# Patient Record
Sex: MALE | Race: WHITE
[De-identification: names, ages, dates, MRNs, and addresses within clinical notes are randomized per-mention and may not be internally consistent; named-entity substitution may affect disease eponyms.]

---

## 2019-10-27 NOTE — HP
CIWA Score


Nausea/Vomitin


Muscle Tremors: 2


Anxiety: 3


Agitation: 3


Paroxysmal Sweats: No Perspiration


Orientation: 0-Oriented


Tacttile Disturbances: 1-Very Mild Itch/Numbness


Auditory Disturbances: 0-None


Visual Disturbances: 0-None


Headache: 2-Mild


CIWA-Ar Total Score: 13





- Admission Criteria


[facility] Guidelines: Admission for Medically Managed Detox: 


Requires at least one of the followin. CIWA greater than 12


2. Seizures within the past 24 hours


3. Delirium tremens within the past 24 hours


4. Hallucinations within the past 24 hours


5. Acute intervention needed for co  occurring medical disorder


6. Acute intervention needed for co  occurring psychiatric disorder


7. Severe withdrawal that cannot be handled at a lower level of care (continued


    vomiting, continued diarrhea, abnormal vital signs) requiring intravenous


    medication and/or fluids


8. Pregnancy








Admission ROS S





- HPI


Chief Complaint: 





i need help to stop drinking alcohol and drugs


Allergies/Adverse Reactions: 


 Allergies











Allergy/AdvReac Type Severity Reaction Status Date / Time


 


No Known Allergies Allergy   Verified 10/27/19 09:37











History of Present Illness: 





this 54 years old male with alcohol,cocaine and marijuana,k2,heroin abused,on 

mmtp 100 mgs/day,last medicated methadone on 10/26/19


first time to this facility


never been in detox before


syncope last night


no seizure


seen in [facility] last night ,hepatitis c for 2 years follow up with [facility] 

pending on treatment


bipolar disorder ,non compliance


no sobriety 


plan for outpatient program and mmtp clinic








Exam Limitations: No Limitations





- Ebola screening


Have you traveled outside of the country in the last 21 days: No (N)


Have you had contact with anyone from an Ebola affected area: No


Do you have a fever: No





- Review of Systems


Constitutional: Chills, Malaise, Night Sweats, Changes in sleep, Weakness


EENT: reports: Tearing, Nose Congestion


Respiratory: reports: No Symptoms reported, Other (asthma)


Cardiac: reports: No Symptoms Reported


GI: reports: Nausea, Poor Appetite, Vomiting, Abdominal cramping


: reports: No Symptoms Reported


Musculoskeletal: reports: Back Pain, Muscle Pain


Integumentary: reports: Dryness


Neuro: reports: Headache, Tremors


Endocrine: reports: No Symptoms Reported


Hematology: reports: No Symptoms Reported


Psychiatric: reports: No Sypmtoms Reported, Judgement Intact, Mood/Affect 

Appropiate, other (bipolar disorder)


Other Systems: Reviewed and Negative





Patient History





- Patient Medical History


Hx Anemia: No


Hx Asthma: Yes (on albuterol inhaler)


Hx Chronic Obstructive Pulmonary Disease (COPD): No


Hx Cancer: No


Hx Cardiac Disorders: No


Hx Congestive Heart Failure: No


Hx Hypertension: No


Hx Hypercholesterolemia: No


Hx Pacemaker: No


HX Cerebrovascular Accident: No


Hx Seizures: No


Hx Dementia: No


Hx Diabetes: No


Hx Gastrointestinal Disorders: No


Hx Liver Disease: Yes (hapatitis c no treatment follow up with pmd at [facility])


Hx Genitourinary Disorders: No


Hx Sexually Transmitted Disorders: No


Hx Renal Disease (ESRD): No


Hx Thyroid Disease: No


Hx Human Immunodeficiency Virus (HIV): No ( negative)


Hx Hepatitis C: Yes (no treatment)


Hx Depression: No


Hx Suicide Attempt: No


Hx Bipolar Disorder: Yes (non compliance with medication)


Hx Schizophrenia: Yes (non compliance with medication)


Other Medical History: no sicidal,no homicidal





- Patient Surgical History


Past Surgical History: No





- PPD History


Previous Implant?: Yes


Documented Results: Negative w/o proof


Implanted On Prior [facility] Admission?: No


PPD to be Administered?: Yes





- Smoking Cessation


Smoking history: Current every day smoker


Have you smoked in the past 12 months: Yes


Aproximately how many cigarettes per day: 20


Cigars Per Day: 0


Hx Chewing Tobacco Use: No


Initiated information on smoking cessation: Yes


'Breaking Loose' booklet given: 10/27/19





- Substance & Tx. History


Hx Alcohol Use: Yes


Hx Substance Use: Yes


Substance Use Type: Alcohol, Cocaine, Heroin, Marijuana


Hx Substance Use Treatment: No





- Substances abused


  ** Alcohol


Substance route: Oral


Frequency: Daily


Amount used: 7 PINTS  OF VODKA


Age of first use: 25


Date of last use: 10/26/19





  ** Heroin


Substance route: Injection


Frequency: Daily


Amount used: 13 BAGS


Age of first use: 13


Date of last use: 10/26/19





  ** Cocaine


Substance route: Injection


Frequency: Daily


Amount used: 2 grams


Age of first use: 25


Date of last use: 10/26/19





  ** Marijuana/Hashish


Substance route: Smoking


Frequency: Daily


Amount used: 10$


Age of first use: 19


Date of last use: 10/26/19





  ** K2/Spice


Substance route: Smoking


Frequency: Daily


Amount used: 5$


Age of first use: 54


Date of last use: 10/26/19





Admission Physical Exam [facility]





- Vital Signs


Vital Signs: 


 Vital Signs - 24 hr











  10/27/19





  09:33


 


Temperature 97.7 F


 


Pulse Rate 71


 


Respiratory 20





Rate 


 


Blood Pressure 100/64














- Physical


General Appearance: Yes: Moderate Distress, Tremorous, Irritable, Anxious


HEENTM: Yes: Normal ENT Inspection, ABELINO, Pharynx Normal


Respiratory: Yes: Lungs Clear, Normal Breath Sounds, No Respiratory Distress


Neck: Yes: Within Normal Limits, Supple, Trachea in good position


Breast: Yes: Within Normal Limits


Cardiology: Yes: Within Normal Limits, Regular Rhythm, Regular Rate, S1, S2


Abdominal: Yes: Within Normal Limits, Normal Bowel Sounds, Non Tender, Soft


Genitourinary: Yes: Within Normal Limits


Musculoskeletal: Yes: Back pain, Muscle Pain


Extremities: Yes: Tremors


Neurological: Yes: Within Normal Limits, CNs II-XII NML intact, Fully Oriented, 

Alert


Integumentary: Yes: Dry, Track Marks


Lymphatic: Yes: Within Normal Limits





- Diagnostic


(1) Alcohol dependence with uncomplicated withdrawal


Current Visit: Yes   Status: Acute   





(2) History of opioid abuse


Current Visit: Yes   Status: Acute   





(3) Methadone maintenance therapy patient


Current Visit: Yes   Status: Acute   





(4) IVDU (intravenous drug user)


Current Visit: Yes   Status: Acute   





(5) Bipolar disorder


Current Visit: Yes   Status: Deleted   





(6) Schizophrenia


Current Visit: Yes   Status: Acute   





(7) Dehydration


Current Visit: Yes   Status: Acute   





(8) Asthma


Current Visit: Yes   Status: Chronic   





(9) Use of cane as ambulatory aid


Current Visit: Yes   Status: Acute   





Cleared for Admission [facility]





- Detox or Rehab


[facility] Level of Care: Medically Managed


Detox Regimen/Protocol: Librium





Inpatient Rehab Admission





- Rehab Decision to Admit


Inpatient rehab admission?: No

## 2019-10-27 NOTE — PDOC
History of Present Illness





- General


Stated Complaint: FALL


Time Seen by Provider: 10/27/19 21:36


History Source: Patient, EMS


Exam Limitations: No Limitations





- History of Present Illness


Initial Comments: 





10/27/19 22:05


Tara Graf is a 54M history of polysubstance abuse and psychiatric 

disorders sent from [facility] for a fall.





Patient says he got dizzy and fell forward onto his hands, hit the back of the 

right side of his head and says he has a headache and a bump there. Has pain in 

his right hand, and the middle finger of his left hand. Known history of L knee 

pain.





Admitted to [facility] for detox, requesting 100mg methadone but was only given 

30mg with Librium.





Past History





- Past Medical History


Allergies/Adverse Reactions: 


 Allergies











Allergy/AdvReac Type Severity Reaction Status Date / Time


 


No Known Allergies Allergy   Verified 10/27/19 09:37











Home Medications: 


Ambulatory Orders





Albuterol Sulfate Inhaler - [Ventolin Hfa Inhaler -] 2 inh PO PRN PRN 10/27/19 


Methadone [Dolophine -] 100 mg PO DAILY 10/27/19 








Anemia: No


Asthma: Yes


Cancer: No


Cardiac Disorders: No


CVA: No


COPD: No


CHF: No


Dementia: No


Diabetes: No


GI Disorders: No


 Disorders: No


HTN: No


Hypercholesterolemia: No


Kidney Stones: No


Liver Disease: Yes (hapatitis c no treatment follow up with pmd at [place])


Seizures: No


Thyroid Disease: No





- Surgical History


Abdominal Surgery: No


Appendectomy: No


Cardiac Surgery: No


Cholecystectomy: No


Lung Surgery: No


Neurologic Surgery: No


Orthopedic Surgery: No





- Reproductive History


Testicular Surgery: No





- Psycho Social/Smoking Cessation Hx


Smoking History: Current every day smoker


Have you smoked in the past 12 months: Yes


Number of Cigarettes Smoked Daily: 20


Cigars Per Day: 0


'Breaking Loose' booklet given: 10/27/19


Hx Alcohol Use: Yes


Drug/Substance Use Hx: Yes


Substance Use Type: Alcohol, Cocaine, Heroin, Marijuana


Hx Substance Use Treatment: Yes





**Review of Systems





- Review of Systems


Constitutional: Yes: Weakness.  No: Chills, Fever


HEENTM: No: Symptoms Reported


Respiratory: No: Symptoms reported


Cardiac (ROS): Yes: Lightheadedness.  No: Chest Pain, Syncope


ABD/GI: No: Constipated, Diarrhea, Nausea, Vomiting


: No: Symptoms Reported


Musculoskeletal: Yes: Muscle Weakness


Integumentary: No: Symptoms Reported


Neurological: Yes: Headache, Unsteady Gait (known L knee pain).  No: Numbness, 

Paresthesia


Hematologic/Lymphatic: No: Symptoms Reported


All Other Systems: Reviewed and Negative





*Physical Exam





- Physical Exam


General Appearance: Yes: Nourished, Appropriately Dressed, Moderate Distress (

agitated, says he feels sick, wants to walk home)


HEENT: positive: EOMI, Normal Voice, Symmetrical, Hearing Grossly Normal, Other 

(raised bump to the posterior R head).  negative: Scleral Icterus (R), Scleral 

Icterus (L)


Neck: positive: Supple.  negative: Tender, Rigid, Lymphadenopathy (R), 

Lymphadenopathy (L)


Respiratory/Chest: positive: Lungs Clear, Normal Breath Sounds.  negative: 

Chest Tender, Respiratory Distress, Accessory Muscle Use, Crackles, Rales, 

Rhonchi, Stridor, Wheezing


Cardiovascular: positive: Regular Rhythm, Regular Rate.  negative: Murmur


Gastrointestinal/Abdominal: positive: Normal Bowel Sounds, Flat, Soft.  negative

: Tender, Hernia


Musculoskeletal: positive: Normal Inspection.  negative: CVA Tenderness


Extremity: positive: Normal Inspection, Normal Range of Motion, Tender (L knee, 

middle finger L hand. Palpation no evidence of dislocation or fracture, but 

tender to palpation.), Other (L knee tender to palpation, ROM limited 2/2 pain. 

BLE sensation intact to LT, DP pulses present, skin warm. R hand full ROM, no 

evidence of fracture or dislocation, no erythema. L hand full ROM, swelling and 

erythema noted to L middle finger.)


Integumentary: positive: Normal Color, Dry, Warm


Neurologic: positive: Alert, Normal Mood/Affect, Normal Response, Other (walks 

with LLE limp, no unsteadiness)





ED Treatment Course





- RADIOLOGY


Radiology Studies Ordered: 














 Category Date Time Status


 


 CERVICAL SPINE CT W/O CONTR [CT] Stat CT Scan  10/27/19 21:38 Ordered


 


 HEAD CT WITHOUT CONTRAST [CT] Stat CT Scan  10/27/19 21:38 Ordered


 


 HAND- LEFT [RAD] Stat Radiology  10/27/19 22:04 Ordered


 


 HAND- RIGHT [RAD] Stat Radiology  10/27/19 21:38 Ordered














Medical Decision Making





- Medical Decision Making





10/27/19 22:05


Tara Graf is a 54M history of polysubstance abuse and psychiatric 

disorders sent from [facility] for a fall.





Has obvious bump to back of head, complains of pain in his hands and L knee. 





CT head/c-cpine


XR hands and L knee





10/27/19 23:57


Imaging done, no evidence of fracture on hands or knee.


Knee pain likely 2/2 muscular etiology vs. tendon injury, no arthritic changes 

or fracture notable.


Pending CT head and neck read.





10/28/19 01:02


No ICH on CT head, no fracture on C-Spine.





Stable to be discharged back to [facility].





Discharge





- Discharge Information


Problems reviewed: Yes


Clinical Impression/Diagnosis: 


Fall


Qualifiers:


 Encounter type: initial encounter Qualified Code(s): W19.XXXA - Unspecified 

fall, initial encounter





Condition: Stable


Disposition: TRANSFER ACUTE CARE/OTHER HOSP





- Admission


No





- Follow up/Referral





- Patient Discharge Instructions


Patient Printed Discharge Instructions:  How to Prevent Falls


Additional Instructions: 


Today you were evaluated for a fall. The CT scans of your head and neck show no 

broken bones or bleeding in your brain, X-rays of your hands and knee do not 

show any broken bones. You have no severe injuries from your fall requiring 

treatment at this time.





Your CT scan shows some atelectatic changes that are not immediately concerning

, but would need a further CT scan of your chest to further examine. If you 

need images from your visit, please call our radiology department.





Please follow-up with your primary doctor in the next 3 days for further care. 

At [facility], please continue your detox as scheduled. If you experience more 

falls, headache, changes to vision, neck pain, nausea, vomiting, or any other 

new or concerning symptoms, please return to the emergency room.





- Post Discharge Activity

## 2019-10-27 NOTE — PN
[facility] Progress Note


Note: 





MD'S NOTE:





INFORMED AT ABOUT 8:10PM THAT THE PT. FELL ON THE FLOOR 





SUB: FEELS WEAK AND DIZZY AND FELL


       HE CLAIMS THAT HE HIT THE RT. SIDE OF THE HEAD


       PAIN IN RT. WRIST+


OBJ: THE PT. IS  X 3, NOT DYSPNEIC AND NOT IN ACUTE DISTRESS





V/S: 98.2-16-/62





L/E: RT. SIDE OF THE HEAD: TENDERNESS++.


                                       NO SWELLING


      RT. WRIST REGION: MILD SWELLING AND TENDERNESS+++


                                 MOVEMENTS ARE LIMITED





S/E: CNS: PUPILS: SABIHA


              NECK: NO RIGIDITY NOTED


              NO FOCAL RIGIDITY NOTED AT THE TIME OF THIS EXAMINATION





       CVS: -JVD, NL HEART SOUNDS, NO MURMURS


    LUNGS: VESICULAR BREATH SOUNDS, NO RALES, NO RHONCHI


       ABD: SOFT, NT, B.S.+





IMPRESSION: FALL WITH SOFT TISSUE INJURY - RT. SIDE OF THE HEAD


                  SPRAIN - RT. WRIST REGION - R/O: FRACTURE





PLANS:        -THE PT. WAS SENT TO THE ER FOR EVALUATION AS PER THE 


                   FALL PROTOCOL #1


                  -DR. SAMUELS, THE ER-MD WAS MADE AWARE OF THE EVENTS


                  -WILL F/U AS NEEDED





PROVIDER: BLAYNE GOODMAN MD

## 2019-10-28 NOTE — PDOC
Documentation entered by Chelsea Woodard SCRIBE, acting as scribe for Brittny Mayes MD.








Brittny Mayes MD:  This documentation has been prepared by the tishaibe, 

Chelsea Woodard SCRIBE, under my direction and personally reviewed by me in its 

entirety.  I confirm that the documentation accurately reflects all work, 

treatment, procedures, and medical decision making performed by me.  





Attending Attestation





- Resident


Resident Name: Dimas Johnson





- ED Attending Attestation


I have performed the following: I have examined & evaluated the patient, The 

case was reviewed & discussed with the resident, I agree w/resident's findings 

& plan, Exceptions are as noted





- HPI


HPI: 


10/27/19 23:05


The patient is a 54 year old male with a past medical history significant for 

polysubstance abuse who presents to the emergency department from [place] via EMS s

/p a fall. The patient presents s/p a fall to his right side landing on his 

right hand. The patient reports he felt dizzy prior to the fall. Denies fever 

or chills. 


Allergies: NKDA





- Physicial Exam


PE: 





10/27/19 23:50


I agree with Dr Dimas Johnson 's physical exam.





- Medical Decision Making





10/28/19 01:00


53 yo male who is admitted to [facility] and fell and hit his head and he 

was transported here for imaging





ct scan head No evidence of acute intracranial hemorrhage, right maxillary 

sinus disease, nasal septal deviation


CAT scan of the cervical spine impression


1 no evidence of acute fractures


C2-C3 anterior listhesis


3 mild degenerative changes


Emphysematous, atelectatic and/or fibrotic changes noted a dedicated chest CT 

is recommended for complete evaluation of the lungs


Right maxillary sinus disease


10/28/19 01:08


hand radiographs  negative for acute fractures 


pt discharged back to [facility]

## 2019-10-28 NOTE — PN
[facility] CIWA





- CIWA Score


Nausea/Vomitin-Mild Nausea/No Vomiting


Muscle Tremors: 2


Anxiety: 2


Agitation: 2


Paroxysmal Sweats: No Perspiration


Orientation: 0-Oriented


Tacttile Disturbances: 1-Very Mild Itch/Numbness


Auditory Disturbances: 0-None


Visual Disturbances: 0-None


Headache: 2-Mild


CIWA-Ar Total Score: 10





[facility] Progress Note (SOAP)


Subjective: 





alert,irritable,anxious,interrupted sleep,pain in the body ,back,tremor


Objective: 





10/28/19 14:20


 Vital Signs











Temperature  97.7 F   10/28/19 13:12


 


Pulse Rate  71   10/28/19 13:12


 


Respiratory Rate  16   10/28/19 13:12


 


Blood Pressure  124/58 L  10/28/19 13:12


 


O2 Sat by Pulse Oximetry (%)      








 Laboratory Last Values











WBC  5.9 K/mm3 (4.0-10.0)   10/28/19  08:20    


 


RBC  4.53 M/mm3 (4.00-5.60)   10/28/19  08:20    


 


Hgb  12.0 GM/dL (11.7-16.9)   10/28/19  08:20    


 


Hct  37.8 % (35.4-49)   10/28/19  08:20    


 


MCV  83.3 fl (80-96)   10/28/19  08:20    


 


MCH  26.5 pg (25.7-33.7)   10/28/19  08:20    


 


MCHC  31.8 g/dl (32.0-35.9)  L  10/28/19  08:20    


 


RDW  14.7 % (11.9-15.9)   10/28/19  08:20    


 


Plt Count  278 K/MM3 (134-434)   10/28/19  08:20    


 


MPV  8.0 fl (7.5-11.1)   10/28/19  08:20    


 


Sodium  139 mmol/L (136-145)   10/28/19  08:20    


 


Potassium  4.3 mmol/L (3.5-5.1)   10/28/19  08:20    


 


Chloride  104 mmol/L ()   10/28/19  08:20    


 


Carbon Dioxide  29 mmol/L (21-32)   10/28/19  08:20    


 


Anion Gap  6 MMOL/L (8-16)  L  10/28/19  08:20    


 


BUN  18.4 mg/dL (7-18)  H  10/28/19  08:20    


 


Creatinine  0.8 mg/dL (0.55-1.3)   10/28/19  08:20    


 


Est GFR (CKD-EPI)AfAm  116.56   10/28/19  08:20    


 


Est GFR (CKD-EPI)NonAf  100.57   10/28/19  08:20    


 


Random Glucose  92 mg/dL ()   10/28/19  08:20    


 


Calcium  8.9 mg/dL (8.5-10.1)   10/28/19  08:20    


 


Total Bilirubin  0.2 mg/dL (0.2-1)   10/28/19  08:20    


 


AST  27 U/L (15-37)   10/28/19  08:20    


 


ALT  24 U/L (13-61)   10/28/19  08:20    


 


Alkaline Phosphatase  117 U/L ()   10/28/19  08:20    


 


Total Protein  6.8 g/dl (6.4-8.2)   10/28/19  08:20    


 


Albumin  3.0 g/dl (3.4-5.0)  L  10/28/19  08:20    


 


RPR Titer  Nonreactive  (NONREACTIVE)   10/28/19  08:20    


 


HIV 1&2 Antibody Screen  Negative   10/28/19  08:20    


 


HIV P24 Antigen  Negative   10/28/19  08:20    











Assessment: 





10/28/19 14:21


withdrawal symptom


Plan: 





continue detox,bun 18.4,probably due to dehydration,encourage oral fluid,obtain 

cane for ambulatory aid
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
## 2019-10-29 NOTE — PN
[facility] CIWA





- CIWA Score


Nausea/Vomitin-Mild Nausea/No Vomiting


Muscle Tremors: 2


Anxiety: 2


Agitation: 2


Paroxysmal Sweats: No Perspiration


Orientation: 0-Oriented


Tacttile Disturbances: 1-Very Mild Itch/Numbness


Auditory Disturbances: 0-None


Visual Disturbances: 0-None


Headache: 1-Very Mild


CIWA-Ar Total Score: 9





[facility] COWS





- Scale


Resting Pulse: 0= FL 80 or Below


Sweatin= No chills or Flushing


Restless Observation: 1= Difficult to Sit Still


Pupil Size: 1= Pupils >than Normal


Bone or Joint Aches: 1= Mild Discomfort


Runny Nose/ Eye Tearin= Nasal Congestion


GI Upset > 30mins: 2= Nausea/Diarrhea


Tremor Observation of Outstretched Hands: 2= Slight Tremor Visible


Yawning Observation: 1= 1-2x During Session


Anxiety or Irritability: 2=Irritable/Anxious


Goose Flesh Skin: 0=Smooth Skin


COWS Score: 11





[facility] Progress Note (SOAP)


Subjective: 





alert,irritable,anxious,loose bowel movement,pain in the body,back


Objective: 





10/29/19 11:30


 Vital Signs











Temperature  97.9 F   10/29/19 09:37


 


Pulse Rate  84   10/29/19 09:37


 


Respiratory Rate  18   10/29/19 09:37


 


Blood Pressure  119/64   10/29/19 09:37


 


O2 Sat by Pulse Oximetry (%)      











Assessment: 





10/29/19 11:31


withdrawal symptom


Plan: 





continue detox methadone and lirium regimen

## 2019-10-30 NOTE — PN
[facility] Progress Note


Note: 





 Vital Signs











Temperature  98.1 F   10/30/19 17:04


 


Pulse Rate  64   10/30/19 17:04


 


Respiratory Rate  20   10/30/19 17:04


 


Blood Pressure  92/57 L  10/30/19 17:04


 


O2 Sat by Pulse Oximetry (%)      








/80


PATIENT EVALUATED D/T EPISODE  OF DIZZY SPELL AND LOWER SELF TO THE FLOOR 


PATIENT REPORTS HE BEND FORWARD AND FELT DIZZY, DENIES HEADACHE  ANY OTHER 

SYMPTOMS


PATIENT AOX3 NO ACUTE DISTRESS


EENT WNL, CHEILITHIS


FULL ROM NO GAIT DISTURBANCE AMBULATING IN THE UNIT WITH CANE 





VERTIGO 





INCREASE PO FLUIDS 


WHEELCHAIR 


CONTINUE TO MONITOR

## 2019-10-30 NOTE — PN
S CIWA





- CIWA Score


Nausea/Vomitin-Mild Nausea/No Vomiting


Muscle Tremors: 1-None Visible, but Felt


Anxiety: 2


Agitation: 2


Paroxysmal Sweats: No Perspiration


Orientation: 0-Oriented


Tacttile Disturbances: 1-Very Mild Itch/Numbness


Auditory Disturbances: 0-None


Visual Disturbances: 0-None


Headache: 1-Very Mild


CIWA-Ar Total Score: 8





BHS Progress Note (SOAP)


Subjective: 





alert,irritable,anxious,interrupted sleep,pain in the body,knees


Objective: 





10/30/19 09:43


 Vital Signs











Temperature  97.9 F   10/30/19 06:23


 


Pulse Rate  74   10/30/19 06:23


 


Respiratory Rate  18   10/30/19 06:23


 


Blood Pressure  108/63   10/30/19 06:23


 


O2 Sat by Pulse Oximetry (%)      











Assessment: 





10/30/19 09:43


withdrawal symptom


Plan: 





continue detox librium regimen

## 2019-10-31 NOTE — PN
BHS COWS





- Scale


Resting Pulse: 1= MI 


Sweatin= No chills or Flushing


Restless Observation: 1= Difficult to Sit Still


Pupil Size: 1= Pupils >than Normal


Bone or Joint Aches: 1= Mild Discomfort


Runny Nose/ Eye Tearin= Nasal Congestion


GI Upset > 30mins: 1= Stomach Cramp


Tremor Observation of Outstretched Hands: 1= Tremor Felt, Not Seen


Yawning Observation: 1= 1-2x During Session


Anxiety or Irritability: 2=Irritable/Anxious


Goose Flesh Skin: 0=Smooth Skin


COWS Score: 10





BHS Progress Note (SOAP)


Subjective: 





alert,feel weak,diarrhea,pain in the body


Objective: 





10/31/19 09:46


 Vital Signs











Temperature  97.7 F   10/31/19 09:19


 


Pulse Rate  91 H  10/31/19 09:19


 


Respiratory Rate  20   10/31/19 09:19


 


Blood Pressure  103/62   10/31/19 09:19


 


O2 Sat by Pulse Oximetry (%)      











Assessment: 





10/31/19 09:46


withdrawal symptom


Plan: 





continue detox librium regimen,discharge in am

## 2019-11-01 NOTE — DS
BHS Detox Discharge Summary


Admission Date: 


10/27/19





Discharge Date: 11/01/19





- History


Present History: Alcohol Dependence, Cannabis Dependence, Cocaine Dependence, 

MMTP





- Physical Exam Results


Vital Signs: 


 Vital Signs











Temperature  96.6 F L  11/01/19 06:00


 


Pulse Rate  75   11/01/19 06:00


 


Respiratory Rate  18   11/01/19 06:00


 


Blood Pressure  105/76   11/01/19 06:00


 


O2 Sat by Pulse Oximetry (%)      











Pertinent Admission Physical Exam Findings: 





pt arrived in withdrawals


 Vital Signs











Temperature  96.6 F L  11/01/19 06:00


 


Pulse Rate  75   11/01/19 06:00


 


Respiratory Rate  18   11/01/19 06:00


 


Blood Pressure  105/76   11/01/19 06:00


 


O2 Sat by Pulse Oximetry (%)      








 Laboratory Tests











  10/28/19 10/28/19 10/28/19





  08:20 08:20 08:20


 


WBC   5.9 


 


RBC   4.53 


 


Hgb   12.0 


 


Hct   37.8 


 


MCV   83.3 


 


MCH   26.5 


 


MCHC   31.8 L 


 


RDW   14.7 


 


Plt Count   278 


 


MPV   8.0 


 


Sodium    139


 


Potassium    4.3


 


Chloride    104


 


Carbon Dioxide    29


 


Anion Gap    6 L


 


BUN    18.4 H


 


Creatinine    0.8


 


Est GFR (CKD-EPI)AfAm    116.56


 


Est GFR (CKD-EPI)NonAf    100.57


 


Random Glucose    92


 


Calcium    8.9


 


Total Bilirubin    0.2


 


AST    27


 


ALT    24


 


Alkaline Phosphatase    117


 


Total Protein    6.8


 


Albumin    3.0 L


 


RPR Titer   


 


HIV 1&2 Antibody Screen  Negative  


 


HIV P24 Antigen  Negative  














  10/28/19





  08:20


 


WBC 


 


RBC 


 


Hgb 


 


Hct 


 


MCV 


 


MCH 


 


MCHC 


 


RDW 


 


Plt Count 


 


MPV 


 


Sodium 


 


Potassium 


 


Chloride 


 


Carbon Dioxide 


 


Anion Gap 


 


BUN 


 


Creatinine 


 


Est GFR (CKD-EPI)AfAm 


 


Est GFR (CKD-EPI)NonAf 


 


Random Glucose 


 


Calcium 


 


Total Bilirubin 


 


AST 


 


ALT 


 


Alkaline Phosphatase 


 


Total Protein 


 


Albumin 


 


RPR Titer  Nonreactive


 


HIV 1&2 Antibody Screen 


 


HIV P24 Antigen 








today pt is aaox3


ambulating


no acute distress


no s/s of withdrawals








- Treatment


Hospital Course: Detox Protocol Followed, Detoxed Safely, Responded well, 

Discharged Condition Good, Rehab Referral Accepted


Patient has Accepted a Rehab Referral to: pt referred to [facility] inpatient 

rehab





- Medication


Discharge Medications: 


Ambulatory Orders





Albuterol Sulfate Inhaler - [Ventolin Hfa Inhaler -] 2 inh PO PRN PRN 10/27/19 


Methadone [Dolophine -] 100 mg PO DAILY 10/27/19 











- Diagnosis


(1) Alcohol dependence with uncomplicated withdrawal


Current Visit: Yes   Status: Chronic   





(2) Cannabis dependence


Current Visit: Yes   Status: Chronic   





(3) Cocaine dependence


Current Visit: Yes   Status: Chronic   


Qualifiers: 


   Substance use status: uncomplicated   Qualified Code(s): F14.20 - Cocaine 

dependence, uncomplicated   





(4) IVDU (intravenous drug user)


Current Visit: Yes   Status: Chronic   





(5) Methadone maintenance therapy patient


Current Visit: Yes   Status: Chronic   





(6) Schizophrenia


Current Visit: Yes   Status: Acute   





(7) Substance-induced anxiety disorder


Current Visit: Yes   Status: Acute   





(8) Substance-induced sleep disorder


Current Visit: Yes   Status: Acute   





(9) Use of cane as ambulatory aid


Current Visit: Yes   Status: Acute   





(10) Asthma


Current Visit: Yes   Status: Chronic   


Qualifiers: 


   Asthma severity: mild   Asthma persistence: intermittent 





(11) Hepatitis C


Current Visit: Yes   Status: Chronic   





(12) Nicotine dependence


Current Visit: Yes   Status: Chronic   


Qualifiers: 


   Nicotine product type: cigarettes   Substance use status: uncomplicated   

Qualified Code(s): F17.210 - Nicotine dependence, cigarettes, uncomplicated   





(13) PTSD (post-traumatic stress disorder)


Current Visit: Yes   Status: Chronic   





(14) Fall


Current Visit: Yes   Status: Acute   


Qualifiers: 


   Encounter type: initial encounter   Qualified Code(s): W19.XXXA - 

Unspecified fall, initial encounter   





- AMA


Did Patient Leave Against Medical Advice: No

## 2019-11-29 NOTE — HP
CIWA Score


Nausea/Vomitin-No Nausea/No Vomiting


Muscle Tremors: 1-None Visible, but Felt


Anxiety: 1-Mildly Anxious


Agitation: 0-Normal Activity


Paroxysmal Sweats: No Perspiration


Orientation: 1-Uncertain about Date


Tacttile Disturbances: 0-None


Auditory Disturbances: 2-Mild Harshness/Frighten


Visual Disturbances: 2-Mild Sensitivity


Headache: 3-Moderate


CIWA-Ar Total Score: 10





- Admission Criteria


[facility] Guidelines: Admission for Medically Managed Detox: 


Requires at least one of the followin. CIWA greater than 12


2. Seizures within the past 24 hours


3. Delirium tremens within the past 24 hours


4. Hallucinations within the past 24 hours


5. Acute intervention needed for co  occurring medical disorder


6. Acute intervention needed for co  occurring psychiatric disorder


7. Severe withdrawal that cannot be handled at a lower level of care (continued


    vomiting, continued diarrhea, abnormal vital signs) requiring intravenous


    medication and/or fluids


8. Pregnancy








Admitting History and Physical





- Smoking History


Smoking history: Current every day smoker


Have you smoked in the past 12 months: Yes


Aproximately how many cigarettes per day: 20





- Alcohol/Substance Use


Hx Alcohol Use: Yes





Admission ROS [facility]





- [facility]


Allergies/Adverse Reactions: 


 Allergies











Allergy/AdvReac Type Severity Reaction Status Date / Time


 


No Known Allergies Allergy   Verified 19 18:59











History of Present Illness: 








Search Terms: tico moulton, 1967


Search Date: 2019 08:39:38 PM





The Drug Utilization Report below displays all of the controlled substance 

prescriptions, if any, that your patient has filled in the last twelve months. 

The information displayed on this report is compiled from pharmacy submissions 

to the Department, and accurately reflects the information as submitted by the 

pharmacies.





This report was requested by: Gita Lewis | Reference #: [id number]





There are no results for the search terms that you entered.





pt here requesting rehab for cocaine, K2  and cannabis  use  


reports etoh 3  bottles /day  of rum


MMTP x  5 yrs , current daily dose  100 mg  


pt is very poor historian , drowsy  , arousable by verbal stimuli  . 


has  d/c paperwork from [facility]  indicating  ETOH  use  , opioid use  

, cocaine use  


Exam Limitations: Clinical Condition





- Ebola screening


Have you traveled outside of the country in the last 21 days: No (N)


Have you had contact with anyone from an Ebola affected area: No


Do you have a fever: No





- Review of Systems


Constitutional: See HPI


EENT: reports: See HPI


Respiratory: reports: No Symptoms reported


Cardiac: reports: No Symptoms Reported


GI: reports: See HPI


: reports: No Symptoms Reported


Musculoskeletal: reports: No Symptoms Reported


Integumentary: reports: No Symptoms Reported


Neuro: reports: See HPI


Endocrine: reports: No Symptoms Reported


Psychiatric: reports: Disorientated





Patient History





- Patient Medical History


Hx Anemia: No


Hx Asthma: Yes


Hx Chronic Obstructive Pulmonary Disease (COPD): No


Hx Cancer: No


Hx Cardiac Disorders: No


Hx Congestive Heart Failure: No


Hx Hypertension: No


Hx Hypercholesterolemia: No


Hx Pacemaker: No


HX Cerebrovascular Accident: No


Hx Seizures: No


Hx Dementia: No


Hx Diabetes: No


Hx Gastrointestinal Disorders: No


Hx Liver Disease: Yes (hapatitis c no treatment follow up with pmd at [place])


Hx Genitourinary Disorders: No


Hx Sexually Transmitted Disorders: No


Hx Renal Disease (ESRD): No


Hx Thyroid Disease: No


Hx Human Immunodeficiency Virus (HIV): No ( negative)


Hx Hepatitis C: Yes (no treatment)


Hx Depression: Yes


Hx Suicide Attempt: No


Hx Bipolar Disorder: Yes (non compliance with medication)


Hx Schizophrenia: No





- Patient Surgical History


Past Surgical History: No


Hx Neurologic Surgery: No


Hx Cataract Extraction: No


Hx Cardiac Surgery: No


Hx Lung Surgery: No


Hx Breast Surgery: No


Hx Breast Biopsy: No


Hx Abdominal Surgery: No


Hx Appendectomy: No


Hx Cholecystectomy: No


Hx Genitourinary Surgery: No


Hx  Section: No


Hx Orthopedic Surgery: No


Anesthesia Reaction: No





- PPD History


Date: 10/29/19





- Smoking Cessation


Smoking history: Current every day smoker


Have you smoked in the past 12 months: Yes


Aproximately how many cigarettes per day: 20


Cigars Per Day: 0


Hx Chewing Tobacco Use: No


Initiated information on smoking cessation: Yes


'Breaking Loose' booklet given: 19





- Substances abused


  ** Alcohol


Substance route: Oral


Frequency: Daily


Amount used: 3 cups  vodka


Age of first use: 25


Date of last use: 19





  ** Heroin


Substance route: Injection


Frequency: Daily


Amount used: 4 bags


Age of first use: 13


Date of last use: 19





  ** Cocaine


Substance route: Injection


Frequency: Daily


Amount used: 2 grams


Age of first use: 25


Date of last use: 19





  ** Marijuana/Hashish


Substance route: Smoking


Frequency: Daily


Amount used: 10$


Age of first use: 19


Date of last use: 19





  ** K2/Spice


Substance route: Smoking


Frequency: Daily


Amount used: 5$


Age of first use: 54


Date of last use: 19





Admission Physical Exam BHS





- Vital Signs


Vital Signs: 


 Vital Signs - 24 hr











  19





  19:10


 


Temperature 98.5 F


 


Pulse Rate 69


 


Respiratory 16





Rate 


 


Blood Pressure 97/63














- Physical


General Appearance: Yes: Mild Distress, Intoxicated, Other (drowsy)


HEENTM: Yes: EOMI, Hearing grossly Normal, Normocephalic


Respiratory: Yes: Chest Non-Tender, No Respiratory Distress, No Accessory 

Muscle Use, Wheezing (scattered expiratory)


Neck: Yes: No masses,lesions,Nodules, Trachea in good position


Cardiology: Yes: Regular Rhythm, Regular Rate, S1, S2


Abdominal: Yes: Non Tender, Soft


Musculoskeletal: Yes: full range of Motion, Gait Steady


Extremities: Yes: Normal Range of Motion, Pedal Edema


Neurological: Yes: Fully Oriented, Alert, Motor Strength 5/5


Integumentary: Yes: Warm, Track Marks





- Diagnostic


(1) Alcohol dependence with uncomplicated withdrawal


Current Visit: Yes   Status: Chronic   





(2) Cannabis dependence


Current Visit: Yes   Status: Chronic   





(3) Cocaine dependence


Current Visit: Yes   Status: Chronic   


Qualifiers: 


   Substance use status: uncomplicated   Qualified Code(s): F14.20 - Cocaine 

dependence, uncomplicated   





(4) Methadone maintenance therapy patient


Current Visit: Yes   Status: Chronic   





Breathalyzer





- Breathalyzer


Breathalyzer: 0





Urine Drug Screen





- Test Device


Lot number: [id number]


Expiration date: 21





- Control


Is test valid?: Yes





- Results


Drug screen NEGATIVE: No


Urine drug screen results: THC-Marijuana, CARLOS-Cocaine, MOP-Opiates, MTD-

Methadone, BZO-Benzodiazepines





Inpatient Rehab Admission





- Rehab Decision to Admit


Inpatient rehab admission?: No

## 2019-11-30 NOTE — CONSULT
BHS Psychiatric Consult





- Data


Date of interview: 11/30/19


Admission source: [facility]


Identifying data: Readmission to [facility] for this 55 y/o  male self-

referred for detoxification (JULIO issues : heroin, cocaine, cannabis/K2, alcohol

, nicotine). Interviewed at [address] [place] [address]. Patient is single, father of four, homeless

, unemployed and supported on welfare.


Substance Abuse History: Discussed with the patient. Details in current BHS 

report as follows : Smoking history: Current every day smoker.  Have you smoked 

in the past 12 months: Yes.  Aproximately how many cigarettes per day: 20.  

Cigars Per Day: 0.  Hx Chewing Tobacco Use: No.  Initiated information on 

smoking cessation: Yes.  'Breaking Loose' booklet given: 11/29/19.  - 

Substances abused.  ** Alcohol.  Substance route: Oral.  Frequency: Daily.  

Amount used: 3 cups  vodka.  Age of first use: 25.  Date of last use: 11/28/19.

  ** Heroin.  Substance route: Injection.  Frequency: Daily.  Amount used: 4 

bags.  Age of first use: 13.  Date of last use: 11/28/19.  ** Cocaine.  

Substance route: Injection.  Frequency: Daily.  Amount used: 2 grams.  Age of 

first use: 25.  Date of last use: 11/22/19.  ** Marijuana/Hashish.  Substance 

route: Smoking.  Frequency: Daily.  Amount used: 10$.  Age of first use: 19.  

Date of last use: 11/22/19.  ** K2/Spice.  Substance route: Smoking.  Frequency

: Daily.  Amount used: 5$.  Age of first use: 54.  Date of last use: 11/28/19


Medical History: Medical profile is remarkable for hepatitis C, bronchial 

asthma and antecedent of withdrawal-related seizures.


Psychiatric History: Patient endorses a history of " more than 15 " psychiatric 

hospitalizations. First contact with Psychiatry occurred more than 10 years ago 

(diagnosed at the time with post-traumatic stress disorder, bipolar disorder 

versus schizophrenia). Mr Armstrong is currently seeing Dr Hull, 

psychiatrist at [facility] in the [place]. Patient is maintained 

on a regimen of lexapro 20 mg/day + zyprexa 20 mg/hs + klonopin 2 mg/bid + 

ambien 10 mg/hs. He is also on methadone maintenance (100 mg/day) at the [facility] program. Patient reports a history of two serious suicide 

attempts (wrist-cutting in 2004 + hanging in 1989).


Physical/Sexual Abuse/Trauma History: Not discussed. Patient declines.


Additional Comment: Urine drug screen results: THC-Marijuana, CARLOS-Cocaine, MOP-

Opiates, MTD-Methadone, BZO-Benzodiazepines. Noted.





Mental Status Exam





- Mental Status Exam


Alert and Oriented to: Time, Place, Person


Cognitive Function: Good


Patient Appearance: Well Groomed (covered with tattoos : neck, arms and forearms

)


Mood: Nervous, Anxious, Irritable


Affect: Mood Congruent, Constricted


Patient Behavior: Inappropriate (medication-seeking), Cooperative


Speech Pattern: Clear


Voice Loudness: Normal


Thought Process: Goal Oriented


Thought Disorder: Not Present


Hallucinations: Denies


Suicidal Ideation: Denies


Homicidal Ideation: Denies


Insight/Judgement: Poor


Sleep: Poorly, Difficulty falling asleep


Appetite: Good


Gait/Station: Normal (walks independently)





Psychiatric Findings





- Problem List (Axis 1, 2,3)


(1) Alcohol dependence with uncomplicated withdrawal


Current Visit: Yes   Status: Acute   





(2) Opioid dependence on agonist therapy


Current Visit: Yes   Status: Chronic   





(3) Cannabis dependence


Current Visit: Yes   Status: Chronic   





(4) Cocaine dependence


Current Visit: Yes   Status: Chronic   


Qualifiers: 


   Substance use status: uncomplicated   Qualified Code(s): F14.20 - Cocaine 

dependence, uncomplicated   





(5) Nicotine dependence


Current Visit: Yes   Status: Chronic   





(6) Substance induced mood disorder


Current Visit: Yes   Status: Chronic   





(7) History of posttraumatic stress disorder (PTSD)


Current Visit: Yes   Status: Chronic   Comment: Self-report.   





(8) History of bipolar disorder


Current Visit: Yes   Status: Chronic   Comment: Self-report.   





(9) Insomnia


Current Visit: Yes   Status: Acute   





- Initial Treatment Plan


Initial Treatment Plan: Psychoeducation. Records ([facility]) are revisited. 

Detoxification. Medications reconciled : zyprexa 10 mg po hs (start date : 12/1/ 19) + lexapro 20 mg po daily. Insomnia is addressed with melatonin at bedtime. 

Side effects/benefits of these medications are discussed with the patient. Mr Armstrong gave verbal consent to MD. Bernstein.

## 2019-11-30 NOTE — PN
BHS CIWA





- CIWA Score


Nausea/Vomitin-Mild Nausea/No Vomiting


Muscle Tremors: 2


Anxiety: 2


Agitation: 3


Paroxysmal Sweats: 3


Orientation: 0-Oriented


Tacttile Disturbances: 0-None


Auditory Disturbances: 0-None


Visual Disturbances: 0-None


Headache: 1-Very Mild


CIWA-Ar Total Score: 12





BHS Progress Note (SOAP)


Subjective: 





pt here for alcohol use disorder detox treatment, on methadone MAT- 100mg. 

Order written today





O:


 Vital Signs - 24 hr











  19





  19:10 22:15 00:47


 


Temperature 98.5 F 97.9 F 


 


Pulse Rate 69 70 


 


Respiratory 16 18 18





Rate   


 


Blood Pressure 97/63 109/59 L 














  19





  06:00 09:46


 


Temperature 97.9 F 97.4 F L


 


Pulse Rate 72 92 H


 


Respiratory 18 18





Rate  


 


Blood Pressure 131/87 104/66








awake alert, walking, a bit restless





a/p: continue alcohol detox protocol


methadone MAT for OUD

## 2019-12-01 NOTE — PN
S CIWA





- CIWA Score


Nausea/Vomitin-No Nausea/No Vomiting


Muscle Tremors: 3


Anxiety: 2


Agitation: 2


Paroxysmal Sweats: 2


Orientation: 0-Oriented


Tacttile Disturbances: 0-None


Auditory Disturbances: 0-None


Visual Disturbances: 0-None


Headache: 0-None Present


CIWA-Ar Total Score: 9





BHS Progress Note (SOAP)


Subjective: 





stomach cramping


sweats


irritable





Objective: 





19 11:38


 Vital Signs











Temperature  97.5 F L  19 09:24


 


Pulse Rate  92 H  19 09:24


 


Respiratory Rate  18   19 09:24


 


Blood Pressure  133/82   19 09:24


 


O2 Sat by Pulse Oximetry (%)      








 Laboratory Tests











  19





  07:50 07:50


 


WBC  5.2 


 


RBC  4.26 


 


Hgb  11.3 L 


 


Hct  34.9 L 


 


MCV  82.0 


 


MCH  26.6 


 


MCHC  32.4 


 


RDW  15.0 


 


Plt Count  213  D 


 


MPV  8.1 


 


Sodium   140


 


Potassium   4.1


 


Chloride   107


 


Carbon Dioxide   29


 


Anion Gap   5 L


 


BUN   11.8


 


Creatinine   0.7


 


Est GFR (CKD-EPI)AfAm   123.13


 


Est GFR (CKD-EPI)NonAf   106.24


 


Random Glucose   89


 


Calcium   8.6


 


Total Bilirubin   0.1 L


 


AST   26


 


ALT   26


 


Alkaline Phosphatase   112


 


Total Protein   6.5


 


Albumin   3.0 L








aaox3


ambulating


no acute distress


Assessment: 





19 11:39


withdrawals


abdomen assessed; +BS all four quadrants, pt states has normal bowl movement. 

Pt believes gas. 


gas x ordered, bentyl ordered


will reassess.  


Plan: 





continue detox


MOM/mylanta/bently/gas x ordered prn


will reassess

## 2019-12-02 NOTE — EKG
Test Reason : 

Blood Pressure : ***/*** mmHG

Vent. Rate : 062 BPM     Atrial Rate : 062 BPM

   P-R Int : 150 ms          QRS Dur : 086 ms

    QT Int : 464 ms       P-R-T Axes : 063 075 068 degrees

   QTc Int : 470 ms

 

NORMAL SINUS RHYTHM WITH SINUS ARRHYTHMIA

NORMAL ECG

NO PREVIOUS ECGS AVAILABLE

Confirmed by SUDEEP VICENTE MD ([id number]) on 12/2/2019 12:02:02 PM

 

Referred By:             Confirmed By:SUDEEP VICENTE MD

## 2019-12-02 NOTE — DS
BHS Detox Discharge Summary


Admission Date: 


11/29/19





Discharge Date: 12/02/19





- History


Present History: Alcohol Dependence, Cannabis Dependence, Cocaine Dependence, 

MMTP





- Physical Exam Results


Vital Signs: 


 Vital Signs











Temperature  97.5 F L  12/02/19 05:52


 


Pulse Rate  84   12/02/19 05:52


 


Respiratory Rate  18   12/02/19 05:52


 


Blood Pressure  108/59 L  12/02/19 05:52


 


O2 Sat by Pulse Oximetry (%)      











Pertinent Admission Physical Exam Findings: 





pt arrived in withdrawals


 Vital Signs











Temperature  97.5 F L  12/02/19 05:52


 


Pulse Rate  84   12/02/19 05:52


 


Respiratory Rate  18   12/02/19 05:52


 


Blood Pressure  108/59 L  12/02/19 05:52


 


O2 Sat by Pulse Oximetry (%)      








 Laboratory Tests











  11/30/19 11/30/19





  07:50 07:50


 


WBC  5.2 


 


RBC  4.26 


 


Hgb  11.3 L 


 


Hct  34.9 L 


 


MCV  82.0 


 


MCH  26.6 


 


MCHC  32.4 


 


RDW  15.0 


 


Plt Count  213  D 


 


MPV  8.1 


 


Sodium   140


 


Potassium   4.1


 


Chloride   107


 


Carbon Dioxide   29


 


Anion Gap   5 L


 


BUN   11.8


 


Creatinine   0.7


 


Est GFR (CKD-EPI)AfAm   123.13


 


Est GFR (CKD-EPI)NonAf   106.24


 


Random Glucose   89


 


Calcium   8.6


 


Total Bilirubin   0.1 L


 


AST   26


 


ALT   26


 


Alkaline Phosphatase   112


 


Total Protein   6.5


 


Albumin   3.0 L








today pt is aaox3


ambulating


no acute distress


no s/s of withdrawals








- Treatment


Hospital Course: Detox Protocol Followed, Detoxed Safely, Responded well, 

Discharged Condition Good, Rehab Referral Accepted





- Medication


Discharge Medications: 


Ambulatory Orders





Albuterol Sulfate Inhaler - [Ventolin Hfa Inhaler -] 2 inh PO PRN PRN 10/27/19 


Escitalopram Oxalate [Lexapro -] 20 mg PO DAILY 11/29/19 


Olanzapine [Zyprexa] 15 mg PO DAILY 11/29/19 


levETIRAcetam [Keppra -] 100 mg PO BID 11/29/19 











- Diagnosis


(1) Alcohol dependence with uncomplicated withdrawal


Current Visit: Yes   Status: Chronic   





(2) Insomnia


Current Visit: Yes   Status: Acute   





(3) Cannabis dependence


Current Visit: Yes   Status: Chronic   





(4) Cocaine dependence


Current Visit: Yes   Status: Chronic   


Qualifiers: 


   Substance use status: uncomplicated   Qualified Code(s): F14.20 - Cocaine 

dependence, uncomplicated   





(5) History of bipolar disorder


Current Visit: Yes   Status: Chronic   





(6) History of posttraumatic stress disorder (PTSD)


Current Visit: Yes   Status: Chronic   





(7) Methadone maintenance therapy patient


Current Visit: Yes   Status: Chronic   





(8) Nicotine dependence


Current Visit: Yes   Status: Chronic   


Qualifiers: 


   Nicotine product type: cigarettes   Substance use status: uncomplicated   

Qualified Code(s): F17.210 - Nicotine dependence, cigarettes, uncomplicated   





(9) Opioid dependence on agonist therapy


Current Visit: Yes   Status: Chronic   





(10) Substance induced mood disorder


Current Visit: Yes   Status: Chronic   





(11) Fall


Current Visit: No   Status: Acute   


Qualifiers: 


   Encounter type: initial encounter   Qualified Code(s): W19.XXXA - 

Unspecified fall, initial encounter   





(12) Schizophrenia


Current Visit: No   Status: Acute   





(13) Substance-induced anxiety disorder


Current Visit: No   Status: Acute   





(14) Substance-induced sleep disorder


Current Visit: No   Status: Acute   





(15) Use of cane as ambulatory aid


Current Visit: No   Status: Acute   





(16) Asthma


Current Visit: No   Status: Chronic   


Qualifiers: 


   Asthma severity: mild   Asthma persistence: intermittent 





(17) Hepatitis C


Current Visit: No   Status: Chronic   





(18) IVDU (intravenous drug user)


Current Visit: No   Status: Chronic   





(19) Nicotine dependence


Current Visit: No   Status: Chronic   


Qualifiers: 


   Nicotine product type: cigarettes   Substance use status: uncomplicated   

Qualified Code(s): F17.210 - Nicotine dependence, cigarettes, uncomplicated   





(20) PTSD (post-traumatic stress disorder)


Current Visit: No   Status: Chronic   





- AMA


Did Patient Leave Against Medical Advice: No

## 2020-07-10 NOTE — HP
CIWA Score


Nausea/Vomitin-No Nausea/No Vomiting


Muscle Tremors: 5


Anxiety: 5


Agitation: 5


Paroxysmal Sweats: 4-Forehead w/Sweat Beads


Orientation: 3-Disoriented Date>2 days


Tacttile Disturbances: 0-None


Auditory Disturbances: 0-None


Visual Disturbances: 0-None


Headache: 3-Moderate


CIWA-Ar Total Score: 25





- Admission Criteria


[facility] Guidelines: Admission for Medically Managed Detox: 


Requires at least one of the followin. CIWA greater than 12


2. Seizures within the past 24 hours


3. Delirium tremens within the past 24 hours


4. Hallucinations within the past 24 hours


5. Acute intervention needed for co  occurring medical disorder


6. Acute intervention needed for co  occurring psychiatric disorder


7. Severe withdrawal that cannot be handled at a lower level of care (continued


    vomiting, continued diarrhea, abnormal vital signs) requiring intravenous


    medication and/or fluids


8. Pregnancy





Patient presents the following: CIWA greater than 12


Admission Criteria Met: Admission criteria met





Admitting History and Physical





- Smoking History


Smoking history: Current every day smoker


Have you smoked in the past 12 months: Yes


Aproximately how many cigarettes per day: 20





- Alcohol/Substance Use


Hx Alcohol Use: Yes





Admission ROS [facility]





- [facility]


Chief Complaint: 





seeking alcohol detox 2/2 withdrawal sx's


Allergies/Adverse Reactions: 


                                    Allergies











Allergy/AdvReac Type Severity Reaction Status Date / Time


 


No Known Allergies Allergy   Verified 19 18:59











History of Present Illness: 





55 y.o.  male here for alcohol detox. self referred. known to program. 

last here 19 for detox. client reports relapsing right after dc. drinking 

daily all day. + eye opener, black outs, denies seizures, avh, si. he is also on

mmtp at [facility]. reported dose 120 mg. last dose today pending 

verification. he still abusing heroin using 3 bundles daily via ivdu. he speed 

balls with cocaine. denies any clean time in the past 12 months. lives with 

family, unemployed, denies legals


Exam Limitations: No Limitations





- Ebola screening


Have you traveled outside of the country in the last 21 days: No


Have you had contact with anyone from an Ebola affected area: No


Have you been sick,other than usual withdrawal symptoms: No


Do you have a fever: No





- Review of Systems


Constitutional: Chills, Loss of Appetite, Malaise, Night Sweats, Changes in 

sleep


EENT: reports: Dental Problems (dentures)


Respiratory: reports: Shortness of Breath


Cardiac: reports: No Symptoms Reported


GI: reports: Nausea, Poor Fluid Intake, Abdominal cramping


: reports: No Symptoms Reported


Musculoskeletal: reports: No Symptoms Reported


Integumentary: reports: Sweating, Other (track marks)


Neuro: reports: Seizure (r/t alcohol withdrawal. last episode 6 months ago), 

Other (black outs)


Endocrine: reports: Other (pre dm)


Hematology: reports: No Symptoms Reported


Psychiatric: reports: Orientated x3, Anxious, Depressed


Other Systems: Reviewed and Negative





Patient History





- Patient Medical History


Hx Anemia: No


Hx Asthma: Yes


Hx Chronic Obstructive Pulmonary Disease (COPD): No


Hx Cancer: No


Hx Cardiac Disorders: No


Hx Congestive Heart Failure: No


Hx Hypertension: No


Hx Hypercholesterolemia: No


Hx Pacemaker: No


HX Cerebrovascular Accident: No


Hx Seizures: No


Hx Dementia: No


Hx Diabetes: No


Hx Gastrointestinal Disorders: No


Hx Liver Disease: Yes (hapatitis c no treatment)


Hx Genitourinary Disorders: No


Hx Sexually Transmitted Disorders: No


Hx Renal Disease (ESRD): No


Hx Thyroid Disease: No


Hx Human Immunodeficiency Virus (HIV): No


Hx Hepatitis C: Yes (no treatment)


Hx Depression: Yes


Hx Suicide Attempt: No


Hx Bipolar Disorder: Yes (non compliance with medication)


Hx Schizophrenia: No





- Patient Surgical History


Past Surgical History: No


Hx Neurologic Surgery: No


Hx Cataract Extraction: No


Hx Cardiac Surgery: No


Hx Lung Surgery: No


Hx Breast Surgery: No


Hx Breast Biopsy: No


Hx Abdominal Surgery: No


Hx Appendectomy: No


Hx Cholecystectomy: No


Hx Genitourinary Surgery: No


Hx  Section: No


Hx Orthopedic Surgery: No


Anesthesia Reaction: No





- PPD History


Previous Implant?: Yes


Documented Results: Negative w/proof


Implanted On Prior [facility] Admission?: Yes


Date: 10/29/19


Results: no record


PPD to be Administered?: Yes





- Smoking Cessation


Smoking history: Current every day smoker


Have you smoked in the past 12 months: Yes


Aproximately how many cigarettes per day: 30


Cigars Per Day: 0


Hx Chewing Tobacco Use: No


Initiated information on smoking cessation: Yes


'Breaking Loose' booklet given: 07/10/20





- Substance & Tx. History


Hx Alcohol Use: Yes


Hx Substance Use: Yes


Substance Use Type: Alcohol, Cocaine, Prescribed (methadone)


Hx Substance Use Treatment: Yes ([facility])





- Substances abused


  ** Heroin


Substance route: Injection


Frequency: Daily


Amount used: 30


Age of first use: 16


Date of last use: 20





  ** Alcohol


Other (specify): vodka


Substance route: Oral


Frequency: Daily


Amount used: 2 liter


Age of first use: 21


Date of last use: 20





Admission Physical Exam [facility]





- Vital Signs


Vital Signs: 


                               Vital Signs - 24 hr











  07/10/20





  18:23


 


Temperature 97.6 F


 


Pulse Rate 84


 


Respiratory 18





Rate 


 


Blood Pressure 113/75














- Physical


General Appearance: Yes: Moderate Distress, Tremorous, Sweating, Anxious


HEENTM: Yes: EOMI, Normocephalic, Normal Voice, ABELINO, Pharynx Normal, Nasal 

Congestion, Other (missing teeth. top dentures)


Respiratory: Yes: Chest Non-Tender, Lungs Clear, Normal Breath Sounds, No 

Respiratory Distress, No Accessory Muscle Use


Neck: Yes: No masses,lesions,Nodules, Supple, Trachea in good position


Breast: Yes: Breasts Symetrical


Cardiology: Yes: Regular Rhythm, Regular Rate, S1, S2


Abdominal: Yes: Normal Bowel Sounds, Non Tender, Soft


Genitourinary: Yes: Within Normal Limits


Back: Yes: Normal Inspection


Musculoskeletal: Yes: full range of Motion, Gait Steady


Extremities: Yes: Normal Capillary Refill (dirty nail beds), Normal Range of 

Motion, Non-Tender, Tremors


Neurological: Yes: Alert, Motor Strength 5/5, Depressed Affect


Integumentary: Yes: Cold (cool), Clammy, Track Marks (hands/ arms)


Lymphatic: Yes: Within Normal Limits





- Diagnostic


(1) Substance-induced anxiety disorder


Current Visit: Yes   Status: Suspected   





(2) Substance-induced sleep disorder


Current Visit: Yes   Status: Suspected   





(3) Alcohol dependence with uncomplicated withdrawal


Current Visit: No   Status: Chronic   





(4) Asthma


Current Visit: Yes   Status: Acute   


Qualifiers: 


   Asthma severity: mild   Asthma persistence: intermittent 





(5) Cocaine dependence


Current Visit: Yes   Status: Acute   


Qualifiers: 


   Substance use status: uncomplicated   Qualified Code(s): F14.20 - Cocaine 

dependence, uncomplicated   





(6) Hepatitis C


Current Visit: Yes   Status: Chronic   


Qualifiers: 


   Viral hepatitis chronicity: chronic   Hepatic coma status: without hepatic 

coma   Qualified Code(s): B18.2 - Chronic viral hepatitis C   





(7) IVDU (intravenous drug user)


Current Visit: Yes   Status: Chronic   





(8) Nicotine dependence


Current Visit: Yes   Status: Chronic   


Qualifiers: 


   Nicotine product type: cigarettes   Substance use status: uncomplicated   

Qualified Code(s): F17.210 - Nicotine dependence, cigarettes, uncomplicated   





(9) Substance induced mood disorder


Current Visit: Yes   Status: Suspected   





Cleared for Admission S





- Detox or Rehab


[facility] Level of Care: Medically Managed


Detox Regimen/Protocol: Librium


Claeared for Rehab Admission: No





Breathalyzer





- Breathalyzer


Breathalyzer: 0





Urine Drug Screen





- Test Device


Lot number: [id number]


Expiration date: 21





- Control


Is test valid?: Yes





- Results


Drug screen NEGATIVE: No


Urine drug screen results: THC-Marijuana, CARLOS-Cocaine, FEN-Fentanyl, MOP-

Opiates, MTD-Methadone





Inpatient Rehab Admission





- Rehab Decision to Admit


Inpatient rehab admission?: No

## 2020-07-11 NOTE — PN
[facility] CIWA





- CIWA Score


Nausea/Vomitin-No Nausea/No Vomiting


Muscle Tremors: 3


Anxiety: 3


Agitation: 2


Paroxysmal Sweats: 2


Orientation: 0-Oriented


Tacttile Disturbances: 0-None


Auditory Disturbances: 0-None


Visual Disturbances: 2-Mild Sensitivity


Headache: 0-None Present


CIWA-Ar Total Score: 12





[facility] Progress Note (SOAP)


Subjective: 





Complaints of agitation, sweats, anxiety and light sensitivity.


Objective: 





20 13:05


                                   Vital Signs











  20





  05:18 09:00


 


Temperature 98 F 97.3 F L


 


Pulse Rate 76 73


 


Respiratory 16 18





Rate  


 


Blood Pressure 109/69 110/64


 


O2 Sat by Pulse 98 





Oximetry (%)  








                             Laboratory Last Values











WBC  6.3 K/mm3 (4.0-10.0)   20  07:55    


 


RBC  4.37 M/mm3 (4.00-5.60)   20  07:55    


 


Hgb  11.8 GM/dL (11.7-16.9)   20  07:55    


 


Hct  36.2 % (35.4-49)   20  07:55    


 


MCV  82.8 fl (80-96)   20  07:55    


 


MCH  27.0 pg (25.7-33.7)   20  07:55    


 


MCHC  32.6 g/dl (32.0-35.9)   20  07:55    


 


RDW  15.4 % (11.9-15.9)   20  07:55    


 


Plt Count  237 K/MM3 (134-434)   20  07:55    


 


MPV  8.3 fl (7.5-11.1)   20  07:55    


 


Sodium  140 mmol/L (136-145)   20  07:55    


 


Potassium  4.1 mmol/L (3.5-5.1)   20  07:55    


 


Chloride  106 mmol/L ()   20  07:55    


 


Carbon Dioxide  26 mmol/L (21-32)   20  07:55    


 


Anion Gap  8 MMOL/L (8-16)   20  07:55    


 


BUN  15.7 mg/dL (7-18)   20  07:55    


 


Creatinine  0.9 mg/dL (0.55-1.3)   20  07:55    


 


Est GFR (CKD-EPI)AfAm  110.27   20  07:55    


 


Est GFR (CKD-EPI)NonAf  95.14   20  07:55    


 


Random Glucose  119 mg/dL ()  H  20  07:55    


 


Calcium  9.6 mg/dL (8.5-10.1)   20  07:55    


 


Total Bilirubin  0.5 mg/dL (0.2-1)   20  07:55    


 


AST  79 U/L (15-37)  H  20  07:55    


 


ALT  49 U/L (13-61)   20  07:55    


 


Alkaline Phosphatase  134 U/L ()  H  20  07:55    


 


Total Protein  7.5 g/dl (6.4-8.2)   20  07:55    


 


Albumin  3.3 g/dl (3.4-5.0)  L  20  07:55    


 


Syphilis Serology  Non-reactive  (NONREACTIVE)   20  07:55    








Labs noted, COVID 19 testing pending.


Assessment: 





20 13:06


Alert and oriented x3, in no acute respiratory distress.


Full ROM, ambulatory on unit with cane. 


Withdrawal symptoms.


Plan: 





Continue detox protocol.

## 2020-07-11 NOTE — CONSULT
[facility] Psychiatric Consult





- Data


Date of interview: 07/11/20


Admission source: [facility]


Identifying data: Revisit to [facility] and admission to [address] [place] [address] for this 56 y/o 

 male self-referred for detoxification treatment. JULIO issues : heroin, 

cocaine, cannabis/K2, alcohol, nicotine. Patient is single, father of four, 

domiciled, unemployed and supported on welfare.


Substance Abuse History: Discussed with the patient. JULIO profile as follows : 

Smoking history: Current every day smoker.  Have you smoked in the past 12 

months: Yes.  Aproximately how many cigarettes per day: 30.  Cigars Per Day: 0. 

Hx Chewing Tobacco Use: No.  Initiated information on smoking cessation: Yes.  

'Breaking Loose' booklet given: 07/10/20.  - Substance & Tx. History.  Hx 

Alcohol Use: Yes.  Hx Substance Use: Yes.  Substance Use Type: Alcohol, Cocaine,

Prescribed (methadone).  Hx Substance Use Treatment: Yes ([facility]).  - Substances 

abused.  ** Heroin.  Substance route: Injection.  Frequency: Daily.  Amount 

used: 30.  Age of first use: 16.  Date of last use: 07/09/20.  ** Alcohol.  

Other (specify): vodka.  Substance route: Oral.  Frequency: Daily.  Amount used:

2 liter.  Age of first use: 21.  Date of last use: 07/09/20


Medical History: Medical profile is remarkable for hepatitis C, bronchial asthma

and antecedent of withdrawal-related seizures. Patient ambulates with a cane.


Psychiatric History: Patient continue to endorse history of multiple psychiatric

hospitalizations. His first encounter with a mental health care provider 

occurred " more than 10 years ago " at an unnamed facility where he received se

veral psychiatric diagnoses (post-traumatic stress disorder, bipolar disorder, 

schizophrenia). Mr Armstrong is currently seeing a psychiatrist at the 

[facility] (on methadone maintenance : 120 mg/day) in the [place]. Patient 

reports his current medications as lexapro + zyprexa (doses not recalled). He 

admits to a history of two serious suicide attempts (wrist-cutting in 2004 + 

hanging in 1989).


Physical/Sexual Abuse/Trauma History: Patient denies.


Additional Comment: Urine drug screen results: THC-Marijuana, CARLOS-Cocaine, FEN-

Fentanyl, MOP-Opiates, MTD-Methadone. Noted.





Mental Status Exam





- Mental Status Exam


Alert and Oriented to: Time, Place, Person


Cognitive Function: Grossly Intact


Patient Appearance: Unkempt, Disheveled (covered with tattoos : neck, arms, 

forearms)


Mood: Withdrawn


Affect: Mood Congruent, Constricted


Patient Behavior: Sedated (mildly sedated), Fatigued


Speech Pattern: Delayed, Slurred


Voice Loudness: Normal


Thought Process: Goal Oriented


Hallucinations: Denies


Suicidal Ideation: Denies


Homicidal Ideation: Denies


Insight/Judgement: Poor


Sleep: Well


Appetite: Good (noted empty foodtray at bedside; patient is still asking for 

additional food)


Gait/Station: Other (observed making a few steps in his room with his cane)





Psychiatric Findings





- Problem List (Axis 1, 2,3)


(1) Alcohol dependence with uncomplicated withdrawal


Current Visit: Yes   Status: Acute   





(2) Opioid dependence on agonist therapy


Current Visit: Yes   Status: Chronic   





(3) Cannabis dependence


Current Visit: Yes   Status: Chronic   





(4) Cocaine dependence


Current Visit: Yes   Status: Chronic   


Qualifiers: 


   Substance use status: uncomplicated   Qualified Code(s): F14.20 - Cocaine 

dependence, uncomplicated   





(5) Nicotine dependence


Current Visit: Yes   Status: Chronic   


Qualifiers: 


   Nicotine product type: cigarettes   Substance use status: uncomplicated   

Qualified Code(s): F17.210 - Nicotine dependence, cigarettes, uncomplicated   





(6) Substance induced mood disorder


Current Visit: Yes   Status: Chronic   





(7) History of bipolar disorder


Current Visit: Yes   Status: Chronic   Comment: Self-report.   





(8) History of posttraumatic stress disorder (PTSD)


Current Visit: Yes   Status: Chronic   Comment: Self-report.   





(9) Insomnia


Current Visit: Yes   Status: Chronic   





- Initial Treatment Plan


Initial Treatment Plan: Psychoeducation. Sleep hygiene. Detoxification is in 

progress. Resumed : olanzapine 10 mg po hs (reduced dose). Side effects/benefits

discussed with the patient. Mr Armstrong gave consent (verbal) to MD. Writer 

attempted to contact [facility] : closed. Observation.

## 2020-07-12 NOTE — PN
[facility] CIWA





- CIWA Score


Nausea/Vomitin-Mild Nausea/No Vomiting


Muscle Tremors: 3


Anxiety: 2


Agitation: 2


Paroxysmal Sweats: 3


Orientation: 0-Oriented


Tacttile Disturbances: 0-None


Auditory Disturbances: 0-None


Visual Disturbances: 0-None


Headache: 0-None Present


CIWA-Ar Total Score: 11





[facility] Progress Note (SOAP)


Subjective: 





Patient is very drowsy (in bed, easily arousable), went back to sleep


Objective: 





20 14:11


                                Last Vital Signs











Temp Pulse Resp BP Pulse Ox


 


 97.4 F L  75   19   97/59 L  100 


 


 20 12:49  20 12:49  20 12:49  20 12:49  20 12:49





Hypotension noted: encouraged PO water hydration, monitor b/p


                                Laboratory Tests











  20





  07:55 07:55 07:55


 


WBC   6.3 


 


RBC   4.37 


 


Hgb   11.8 


 


Hct   36.2 


 


MCV   82.8 


 


MCH   27.0 


 


MCHC   32.6 


 


RDW   15.4 


 


Plt Count   237 


 


MPV   8.3 


 


Sodium    140


 


Potassium    4.1


 


Chloride    106


 


Carbon Dioxide    26


 


Anion Gap    8


 


BUN    15.7


 


Creatinine    0.9


 


Est GFR (CKD-EPI)AfAm    110.27


 


Est GFR (CKD-EPI)NonAf    95.14


 


Random Glucose    119 H


 


Calcium    9.6


 


Total Bilirubin    0.5


 


AST    79 H


 


ALT    49


 


Alkaline Phosphatase    134 H


 


Total Protein    7.5


 


Albumin    3.3 L


 


Syphilis Serology  Non-reactive  








Labs reviewed: elevated LFTs (AST, Alk phos), serum gluc 119 (high)


Assessment: 





20 14:12


Withdrawal sxs





Noted with hypotension, elevated LFTs and hyperglycemia


Plan: 





Continue detox


Encouraged PO water intake





Hypotension: encouraged to drink more water





Elevated LFTs: repeat AST, Alk phos





Hyperglycemia: repeat fasting glucose, send A1c to r/o DM and prediabetes

## 2020-07-13 NOTE — PN
S CIWA





- CIWA Score


Nausea/Vomitin-No Nausea/No Vomiting


Muscle Tremors: 2


Anxiety: 2


Agitation: 2


Paroxysmal Sweats: 2


Orientation: 0-Oriented


Tacttile Disturbances: 0-None


Auditory Disturbances: 0-None


Visual Disturbances: 0-None


Headache: 0-None Present


CIWA-Ar Total Score: 8





BHS Progress Note (SOAP)


Subjective: 





nausea


sweats


anxiety


shakes


interrupted sleep


Objective: 





20 10:06


                                   Vital Signs











Temperature  97.5 F L  20 09:14


 


Pulse Rate  80   20 09:14


 


Respiratory Rate  16   20 09:14


 


Blood Pressure  96/61   20 09:14


 


O2 Sat by Pulse Oximetry (%)  99   20 05:36








                                Laboratory Tests











  07/10/20 07/11/20 07/11/20





  22:50 07:55 07:55


 


WBC    6.3


 


RBC    4.37


 


Hgb    11.8


 


Hct    36.2


 


MCV    82.8


 


MCH    27.0


 


MCHC    32.6


 


RDW    15.4


 


Plt Count    237


 


MPV    8.3


 


Sodium   


 


Potassium   


 


Chloride   


 


Carbon Dioxide   


 


Anion Gap   


 


BUN   


 


Creatinine   


 


Est GFR (CKD-EPI)AfAm   


 


Est GFR (CKD-EPI)NonAf   


 


Random Glucose   


 


Calcium   


 


Total Bilirubin   


 


AST   


 


ALT   


 


Alkaline Phosphatase   


 


Total Protein   


 


Albumin   


 


Syphilis Serology   Non-reactive 


 


COVID-19 ([facility])  Not detected  














  20





  07:55


 


WBC 


 


RBC 


 


Hgb 


 


Hct 


 


MCV 


 


MCH 


 


MCHC 


 


RDW 


 


Plt Count 


 


MPV 


 


Sodium  140


 


Potassium  4.1


 


Chloride  106


 


Carbon Dioxide  26


 


Anion Gap  8


 


BUN  15.7


 


Creatinine  0.9


 


Est GFR (CKD-EPI)AfAm  110.27


 


Est GFR (CKD-EPI)NonAf  95.14


 


Random Glucose  119 H


 


Calcium  9.6


 


Total Bilirubin  0.5


 


AST  79 H


 


ALT  49


 


Alkaline Phosphatase  134 H


 


Total Protein  7.5


 


Albumin  3.3 L


 


Syphilis Serology 


 


COVID-19 ([facility]) 








labs noted


aaox3


ambulating


no acute distress





Assessment: 





20 10:07


withdrawals


Plan: 





continue detox


zofran sl prn


increase fluids


lidocaine patch


motrin 800mg tid prn

## 2020-07-13 NOTE — PN
[facility] Progress Note


Note: 





pt states he bumped his toe on the chair, witnessed by nursing tech. right toe 

assessed. pt is able to move toe freely, not skin breakdown, no bruising, no s/s

of toe fx. encouraged pt to use his cane for ambulating, if he experience any 

pain/discomfort let RN know, pt may ask for motrin/tylenol prn

## 2020-07-14 NOTE — PN
S CIWA





- CIWA Score


Nausea/Vomitin-Mild Nausea/No Vomiting


Muscle Tremors: 1-None Visible, but Felt


Anxiety: 2


Agitation: 2


Paroxysmal Sweats: No Perspiration


Orientation: 0-Oriented


Tacttile Disturbances: 1-Very Mild Itch/Numbness


Auditory Disturbances: 0-None


Visual Disturbances: 0-None


Headache: 1-Very Mild


CIWA-Ar Total Score: 8





BHS Progress Note (SOAP)


Subjective: 





alert,oriented ,confused at time


ambulation with cane,irritable


Objective: 





20 09:48


                                   Vital Signs











Temperature  97.5 F L  20 05:25


 


Pulse Rate  83   20 05:25


 


Respiratory Rate  18   20 05:25


 


Blood Pressure  125/90   20 05:25


 


O2 Sat by Pulse Oximetry (%)  100   20 05:25











Assessment: 





20 09:48


withdrawal symptom


Plan: 





continue detox librium regimen ,blood for ammonia level,close monitoring

## 2020-07-14 NOTE — PN
[facility] Progress Note


Note: 





ammonia level is 79.30,to give lactulose 20 grams po qid,d/c tylenol,repeat 

hepatic function,ammonia level,cbc in am,close monitoring 


r/o hepatic encephalopathy

## 2020-07-14 NOTE — PN
[facility] Progress Note


Note: 





patient also has history of seizure on keppra 500 mgs po bid,and history of 

asthma on albuterol inhaler,ordered,seizure precaution,


will also do keppra level in am

## 2020-07-14 NOTE — PN
[facility] Progress Note


Note: 


called  by  nursing  for  pt  who  is  agitated  , asking  about  his  property 

 ,  states he is concerned  about his daughter  who  is hospitalized  .  


Pt  was started on Lactulose and Keppra  today . 





                               Vital Signs - 24 hr











  07/13/20 07/14/20 07/14/20





  20:51 05:25 08:45


 


Temperature 97.3 F L 97.5 F L 97.5 F L


 


Pulse Rate 92 H 83 81


 


Respiratory 20 18 18





Rate   


 


Blood Pressure 144/80 125/90 97/63


 


O2 Sat by Pulse 97 100 





Oximetry (%)   














  07/14/20 07/14/20





  14:32 16:48


 


Temperature 97.8 F 97.3 F L


 


Pulse Rate 75 94 H


 


Respiratory 18 16





Rate  


 


Blood Pressure 107/59 L 97/74


 


O2 Sat by Pulse 97 





Oximetry (%)  








                              Abnormal Lab Results











  07/14/20





  10:30


 


Ammonia  79.30 H








                                Laboratory Tests











  07/10/20 07/11/20 07/11/20





  22:50 07:55 07:55


 


WBC    6.3


 


RBC    4.37


 


Hgb    11.8


 


Hct    36.2


 


MCV    82.8


 


MCH    27.0


 


MCHC    32.6


 


RDW    15.4


 


Plt Count    237


 


MPV    8.3


 


Sodium   


 


Potassium   


 


Chloride   


 


Carbon Dioxide   


 


Anion Gap   


 


BUN   


 


Creatinine   


 


Est GFR (CKD-EPI)AfAm   


 


Est GFR (CKD-EPI)NonAf   


 


Random Glucose   


 


Fasting Glucose   


 


Hemoglobin A1c %   


 


Calcium   


 


Total Bilirubin   


 


AST   


 


ALT   


 


Alkaline Phosphatase   


 


Ammonia   


 


Total Protein   


 


Albumin   


 


Syphilis Serology   Non-reactive 


 


COVID-19 ([facility])  Not detected  














  07/11/20 07/13/20 07/13/20





  07:55 08:00 08:00


 


WBC   


 


RBC   


 


Hgb   


 


Hct   


 


MCV   


 


MCH   


 


MCHC   


 


RDW   


 


Plt Count   


 


MPV   


 


Sodium  140  


 


Potassium  4.1  


 


Chloride  106  


 


Carbon Dioxide  26  


 


Anion Gap  8  


 


BUN  15.7  


 


Creatinine  0.9  


 


Est GFR (CKD-EPI)AfAm  110.27  


 


Est GFR (CKD-EPI)NonAf  95.14  


 


Random Glucose  119 H  


 


Fasting Glucose   126 H 


 


Hemoglobin A1c %    6.0


 


Calcium  9.6  


 


Total Bilirubin  0.5  


 


AST  79 H  63 H 


 


ALT  49  


 


Alkaline Phosphatase  134 H  122 H 


 


Ammonia   


 


Total Protein  7.5  


 


Albumin  3.3 L  


 


Syphilis Serology   


 


COVID-19 ([facility])   














  07/14/20





  10:30


 


WBC 


 


RBC 


 


Hgb 


 


Hct 


 


MCV 


 


MCH 


 


MCHC 


 


RDW 


 


Plt Count 


 


MPV 


 


Sodium 


 


Potassium 


 


Chloride 


 


Carbon Dioxide 


 


Anion Gap 


 


BUN 


 


Creatinine 


 


Est GFR (CKD-EPI)AfAm 


 


Est GFR (CKD-EPI)NonAf 


 


Random Glucose 


 


Fasting Glucose 


 


Hemoglobin A1c % 


 


Calcium 


 


Total Bilirubin 


 


AST 


 


ALT 


 


Alkaline Phosphatase 


 


Ammonia  79.30 H


 


Total Protein 


 


Albumin 


 


Syphilis Serology 


 


COVID-19 ([facility]) 








 Pt  AAO x 3  ,ambulating  freely 





 P : continue detox  , observation  for  pt  safety

## 2020-07-15 NOTE — PN
S CIWA





- CIWA Score


Nausea/Vomitin-Mild Nausea/No Vomiting


Muscle Tremors: 2


Anxiety: 1-Mildly Anxious


Agitation: 2


Paroxysmal Sweats: No Perspiration


Orientation: 0-Oriented


Tacttile Disturbances: 1-Very Mild Itch/Numbness


Auditory Disturbances: 0-None


Visual Disturbances: 0-None


Headache: 0-None Present


CIWA-Ar Total Score: 7





[facility] Progress Note (SOAP)


Subjective: 





alert,oriented x 3,drowsy at time,confused at time,on one on one for patient 

safety.unstable on gait while ambulating


Objective: 





07/15/20 13:27


                                   Vital Signs











Temperature  97.7 F   07/15/20 10:00


 


Pulse Rate  75   07/15/20 10:00


 


Respiratory Rate  18   07/15/20 10:00


 


Blood Pressure  116/63   07/15/20 10:00


 


O2 Sat by Pulse Oximetry (%)  97   20 20:34








                             Laboratory Last Values











WBC  5.5 K/mm3 (4.0-10.0)   07/15/20  07:30    


 


RBC  3.92 M/mm3 (4.00-5.60)  L  07/15/20  07:30    


 


Hgb  10.5 GM/dL (11.7-16.9)  L  07/15/20  07:30    


 


Hct  32.5 % (35.4-49)  L  07/15/20  07:30    


 


MCV  83.0 fl (80-96)   07/15/20  07:30    


 


MCH  26.7 pg (25.7-33.7)   07/15/20  07:30    


 


MCHC  32.1 g/dl (32.0-35.9)   07/15/20  07:30    


 


RDW  15.2 % (11.9-15.9)   07/15/20  07:30    


 


Plt Count  227 K/MM3 (134-434)   07/15/20  07:30    


 


MPV  8.1 fl (7.5-11.1)   07/15/20  07:30    


 


Absolute Neuts (auto)  2.8 K/mm3 (1.5-8.0)   07/15/20  07:30    


 


Neutrophils %  51.8 % (42.8-82.8)   07/15/20  07:30    


 


Lymphocytes %  31.5 % (8-40)   07/15/20  07:30    


 


Monocytes %  11.5 % (3.8-10.2)  H  07/15/20  07:30    


 


Eosinophils %  4.7 % (0-4.5)  H  07/15/20  07:30    


 


Basophils %  0.5 % (0-2.0)   07/15/20  07:30    


 


Nucleated RBC %  0 % (0-0)   07/15/20  07:30    


 


PT with INR  11.70 SEC (9.7-13.0)   07/15/20  07:30    


 


INR  0.99  (0.83-1.09)   07/15/20  07:30    


 


Sodium  140 mmol/L (136-145)   07/15/20  07:30    


 


Potassium  4.4 mmol/L (3.5-5.1)   07/15/20  07:30    


 


Chloride  103 mmol/L ()   07/15/20  07:30    


 


Carbon Dioxide  29 mmol/L (21-32)   07/15/20  07:30    


 


Anion Gap  8 MMOL/L (8-16)   07/15/20  07:30    


 


BUN  18.0 mg/dL (7-18)   07/15/20  07:30    


 


Creatinine  0.8 mg/dL (0.55-1.3)   07/15/20  07:30    


 


Est GFR (CKD-EPI)AfAm  115.74   07/15/20  07:30    


 


Est GFR (CKD-EPI)NonAf  99.86   07/15/20  07:30    


 


Random Glucose  122 mg/dL ()  H  07/15/20  07:30    


 


Fasting Glucose  126 mg/dL ()  H  20  08:00    


 


Hemoglobin A1c %  6.0 % (4.2-6.3)   20  08:00    


 


Calcium  9.1 mg/dL (8.5-10.1)   07/15/20  07:30    


 


Total Bilirubin  0.2 mg/dL (0.2-1)   07/15/20  07:30    


 


AST  75 U/L (15-37)  H  07/15/20  07:30    


 


ALT  45 U/L (13-61)   07/15/20  07:30    


 


Alkaline Phosphatase  119 U/L ()  H  07/15/20  07:30    


 


Ammonia  53.90 umol/L (11-32)  H  07/15/20  07:30    


 


Total Protein  6.7 g/dl (6.4-8.2)   07/15/20  07:30    


 


Albumin  3.0 g/dl (3.4-5.0)  L  07/15/20  07:30    


 


Syphilis Serology  Non-reactive  (NONREACTIVE)   20  07:55    


 


COVID-19 ([facility])  Not detected  (Not Detected)   07/10/20  22:50    











07/15/20 13:29


ammonia level is coming down to 53.90


Assessment: 





07/15/20 13:31


less withdrawal 


Plan: 





patient is not stable for discharge,need monitoring ,for alter mental status 

with confusion , slight improvement of ammonia level ,


79.30 on 2020 to 53.90 on 07/15/2020


will monitoring for hapatic encephalopathy


continue hydration ,fluid,patient tolerated diet better than yesterday


continue lactulose 20 gram po qid


continue keppra 500  mgs po bid


close monitoring for seizure,keppra level pending


on methadone 120 mgs po daily maintenance dose ,if no significant improvement 

,may consider reduce methadone dose


still need one on one for patient safety


repeat hepatic function and ammonia level in am

## 2020-07-16 NOTE — PDOC
Attending Attestation





- Resident


Resident Name: Steven Sims





- ED Attending Attestation


I have performed the following: I have examined & evaluated the patient, The 

case was reviewed & discussed with the resident, I agree w/resident's findings &

plan, Exceptions are as noted





- HPI


HPI: 





07/16/20 16:07


Agree with resident HPI





- Physicial Exam


PE: 





07/16/20 16:07


Agree with resident exam 





- Medical Decision Making





07/16/20 14:46


54yo M hx PSA (etoh, cocaine, heroine), on methadone maintenance, seizure d/o, 

schizophrenia, HCV presents to the ED from [facility] detox with AMS x2 days. Pt 

with periods of lethargy, but arousable. Was found to have elevated ammonia 

level concerning for hepatic encephalopathy, sent to ED for further evaluation. 


DDx includes hepatic encephalopathy vs DTs vs psychiatric pathology (denies 

SI/HI)


Unlikely infection as pt with no fevers, chills, cough, urinary sxs, nuchal 

rigidity


Unlikely cva as he is neuro intact


Will also check labs, CTH,  to r/o other metabolic, ischemic causes


Anticipate admission











Discharge





- Discharge Information


Problems reviewed: Yes


Clinical Impression/Diagnosis: 


 Altered mental status, Elevated lipase





Condition: Stable


Disposition: AGAINST MEDICAL ADVICE





- Follow up/Referral





- Patient Discharge Instructions





- Post Discharge Activity

## 2020-07-16 NOTE — PDOC
History of Present Illness





- General


Chief Complaint: Altered Mental Status


Stated Complaint: AMS


Time Seen by Provider: 07/16/20 11:04





- History of Present Illness


Initial Comments: 





Tara Armstrong is a 56 y/o male with PMH significant for alcohol use 

disorder, substance use disorder (cocaine), and opioid use disorder (heroin) 

presenting today for altered mental status. He has been seen at [facility] for 

detox for the past 3 days. He was given his usual dose of methadone 125 mg 

today. 





At present, pt is mildly diaphoretic and restless in the bed. Not able to answer

any questions. Protecting airway and SpO2 at 100%. 








Past History





- Medical History


Allergies/Adverse Reactions: 


                                    Allergies











Allergy/AdvReac Type Severity Reaction Status Date / Time


 


No Known Allergies Allergy   Verified 07/16/20 10:56











Home Medications: 


Ambulatory Orders





Albuterol Sulfate Inhaler - [Ventolin Hfa Inhaler -] 2 inh PO PRN PRN 10/27/19 


Escitalopram Oxalate [Lexapro -] 20 mg PO DAILY 11/29/19 


Olanzapine [Zyprexa] 10 mg PO DAILY 11/29/19 


levETIRAcetam [Keppra -] 500 mg PO BID 11/29/19 








Anemia: No


Asthma: Yes


Cancer: No


Cardiac Disorders: No


CVA: No


COPD: No


CHF: No


Dementia: No


Diabetes: No


GI Disorders: No


 Disorders: No


HTN: Yes


Hypercholesterolemia: No


Kidney Stones: Yes


Liver Disease: Yes (hapatitis c no treatment)


Seizures: Yes


Thyroid Disease: No





- Surgical History


Abdominal Surgery: No


Appendectomy: No


Cardiac Surgery: No


Cholecystectomy: No


Lung Surgery: No


Neurologic Surgery: No


Orthopedic Surgery: No





- Reproductive History


Testicular Surgery: No





- Psycho-Social/Smoking History


Smoking History: Unknown if ever smoked


Have you smoked in the past 12 months: No


Number of Cigarettes Smoked Daily: 30


Cigars Per Day: 0


Information on smoking cessation initiated: No


'Breaking Loose' booklet given: 07/10/20





- Substance Abuse Hx (Audit-C & DAST Scrn)


How often the patient has a drink containing alcohol: 4 0r more times/wk


How often the patient has six or more drinks on one occasion: Daily or almost 

daily


Score: In Men: 4 or > Positive; In Women: 3 or > Positive: 8


Screen Result (Pos requires Nsg. Audit-10AR): Positive


In the last yr the pt used illegal drug/Rx for NonMed reason: Yes


Score:  Yes response is considered Positive: 1


Screen Result (Positive result requires Nsg. DAST-10): Positive





**Review of Systems





- Review of Systems


Able to Perform ROS?: No (AMS)





*Physical Exam





- Vital Signs


                                Last Vital Signs











Temp Pulse Resp BP Pulse Ox


 


 98.1 F   86   22 H  153/68   98 


 


 07/16/20 10:52  07/16/20 10:52  07/16/20 10:52  07/16/20 10:52  07/16/20 10:52














- Physical Exam








GENERAL: Awake, lethargic. 


HEAD: No signs of trauma, normocephalic, atraumatic _


EYES: PERRLA, EOMI, sclera anicteric, conjunctiva clear_


ENT: Hearing grossly normal, nares patent, oropharynx clear without exudates. No

uvular deviation. Moist mucosa_


NECK: Normal ROM, supple, no lymphadenopathy, JVD, or masses_


LUNGS: No distress, clear to auscultation bilaterally _


HEART: Regular rate and rhythm, normal S1 and S2, no murmurs appreciated, 

peripheral pulses normal and equal bilaterally._


ABDOMEN: Soft, nontender, normoactive bowel sounds. No guarding, no rebound. No 

masses_


EXTREMITIES: Normal inspection, Normal range of motion, no edema. No clubbing or

cyanosis_


NEUROLOGICAL: Moving all four extremities. 


SKIN: Warm, Dry, normal turgor, no rashes or lesions noted_








ED Treatment Course





- LABORATORY


CBC & Chemistry Diagram: 


                                 07/16/20 12:30





                                 07/16/20 12:30





- RADIOLOGY


Radiology Studies Ordered: 














 Category Date Time Status


 


 HEAD CT WITHOUT CONTRAST [CT] Stat CT Scan  07/16/20 11:08 Ordered


 


 CHEST X-RAY PORTABLE* [RAD] Stat Radiology  07/16/20 11:09 Ordered














Medical Decision Making





- Medical Decision Making





55M from [facility], hx of ETOH use disorder, substance use disorder, opioid use 

disorder, sent in for altered mental status over the past couple of days and 

lethargy. Ammonia elevated per EMS (concern for hepatic encephalopathy). Hx of 

seizures on keppra. 





07/16/20 12:48


EKG shows 79 bpm, NSR, no axis deviation, QTc 474, no ST elevation/depression.





07/16/20 12:58


CXR negative for acute chest pathology. 





07/16/20 14:46


Wife Donny Hernandez 


[phone]





07/16/20 15:21


CT head negative for acute intracranial pathology.


Labs reviewed.


                             Laboratory Last Values











WBC  7.5 K/mm3 (4.0-10.0)   07/16/20  12:30    


 


RBC  4.32 M/mm3 (4.00-5.60)   07/16/20  12:30    


 


Hgb  11.3 GM/dL (11.7-16.9)  L  07/16/20  12:30    


 


Hct  35.7 % (35.4-49)   07/16/20  12:30    


 


MCV  82.6 fl (80-96)   07/16/20  12:30    


 


MCH  26.2 pg (25.7-33.7)   07/16/20  12:30    


 


MCHC  31.7 g/dl (32.0-35.9)  L  07/16/20  12:30    


 


RDW  15.4 % (11.9-15.9)   07/16/20  12:30    


 


Plt Count  237 K/MM3 (134-434)   07/16/20  12:30    


 


MPV  7.5 fl (7.5-11.1)   07/16/20  12:30    


 


Absolute Neuts (auto)  4.9 K/mm3 (1.5-8.0)   07/16/20  12:30    


 


Neutrophils %  64.9 % (42.8-82.8)  D 07/16/20  12:30    


 


Lymphocytes %  20.8 % (8-40)  D 07/16/20  12:30    


 


Monocytes %  10.1 % (3.8-10.2)   07/16/20  12:30    


 


Eosinophils %  3.8 % (0-4.5)   07/16/20  12:30    


 


Basophils %  0.4 % (0-2.0)   07/16/20  12:30    


 


Nucleated RBC %  0 % (0-0)   07/16/20  12:30    


 


PT with INR  11.30 SEC (9.7-13.0)   07/16/20  12:30    


 


INR  0.96  (0.83-1.09)   07/16/20  12:30    


 


PTT (Actin FS)  30.1 SECONDS (25.2-36.5)   07/16/20  12:30    


 


Sodium  143 mmol/L (136-145)   07/16/20  12:30    


 


Potassium  4.5 mmol/L (3.5-5.1)   07/16/20  12:30    


 


Chloride  107 mmol/L ()   07/16/20  12:30    


 


Carbon Dioxide  29 mmol/L (21-32)   07/16/20  12:30    


 


Anion Gap  7 MMOL/L (8-16)  L  07/16/20  12:30    


 


BUN  21.2 mg/dL (7-18)  H  07/16/20  12:30    


 


Creatinine  0.9 mg/dL (0.55-1.3)   07/16/20  12:30    


 


Est GFR (CKD-EPI)AfAm  110.27   07/16/20  12:30    


 


Est GFR (CKD-EPI)NonAf  95.14   07/16/20  12:30    


 


Random Glucose  110 mg/dL ()  H  07/16/20  12:30    


 


Lactic Acid  1.3 mmol/L (0.4-2.0)   07/16/20  12:30    


 


Calcium  9.3 mg/dL (8.5-10.1)   07/16/20  12:30    


 


Total Bilirubin  0.2 mg/dL (0.2-1)   07/16/20  12:30    


 


AST  70 U/L (15-37)  H  07/16/20  12:30    


 


ALT  50 U/L (13-61)   07/16/20  12:30    


 


Alkaline Phosphatase  128 U/L ()  H  07/16/20  12:30    


 


Ammonia  28.40 umol/L (11-32)   07/16/20  12:30    


 


Creatine Kinase  350 U/L ()  H  07/16/20  12:30    


 


Creatine Kinase Index  0.8 % (0.0-5.0)   07/16/20  12:30    


 


CK-MB (CK-2)  2.9 ng/mL (0.5-3.6)   07/16/20  12:30    


 


Troponin I  < 0.02 ng/ml (0.00-0.05)   07/16/20  12:30    


 


Total Protein  7.3 g/dl (6.4-8.2)   07/16/20  12:30    


 


Albumin  3.1 g/dl (3.4-5.0)  L  07/16/20  12:30    


 


Lipase  3287 U/L ()  H  07/16/20  12:30    


 


TSH  2.51 uIU/ml (0.358-3.74)   07/16/20  12:30    


 


Urine Color  Yellow   07/16/20  11:35    


 


Urine Appearance  Clear   07/16/20  11:35    


 


Urine pH  8.5  (5.0-8.0)  H  07/16/20  11:35    


 


Ur Specific Gravity  1.005  (1.010-1.035)  L  07/16/20  11:35    


 


Urine Protein  Negative  (NEGATIVE)   07/16/20  11:35    


 


Urine Glucose (UA)  Negative  (NEGATIVE)   07/16/20  11:35    


 


Urine Ketones  Negative  (NEGATIVE)   07/16/20  11:35    


 


Urine Blood  Negative  (NEGATIVE)   07/16/20  11:35    


 


Urine Nitrite  Negative  (NEGATIVE)   07/16/20  11:35    


 


Urine Bilirubin  Negative  (NEGATIVE)   07/16/20  11:35    


 


Urine Urobilinogen  0.2 mg/dL (0.2-1.0)   07/16/20  11:35    


 


Ur Leukocyte Esterase  Negative  (NEGATIVE)   07/16/20  11:35    














07/16/20 16:05


D/w Dr. Marina who accepts the patient for admission. 








Discharge





- Discharge Information


Problems reviewed: Yes


Clinical Impression/Diagnosis: 


 Altered mental status, Elevated lipase





Condition: Stable





- Admission


Yes





- Follow up/Referral





- Patient Discharge Instructions





- Post Discharge Activity

## 2020-07-16 NOTE — EKG
Test Reason : 

Blood Pressure : ***/*** mmHG

Vent. Rate : 079 BPM     Atrial Rate : 079 BPM

   P-R Int : 166 ms          QRS Dur : 088 ms

    QT Int : 414 ms       P-R-T Axes : 063 065 051 degrees

   QTc Int : 474 ms

 

NORMAL SINUS RHYTHM

NORMAL ECG

WHEN COMPARED WITH ECG OF 29-NOV-2019 22:13,

NO SIGNIFICANT CHANGE WAS FOUND

Confirmed by PURVI MCDONALD MD ([id number]) on 7/16/2020 2:56:41 PM

 

Referred By:             Confirmed By:PURVI MCDONALD MD

## 2020-07-16 NOTE — HP
<[facility]Tom Briones - Last Filed: 07/17/20 08:20>


CHIEF COMPLAINT:





PCP:





HISTORY OF PRESENT ILLNESS:








ER course was notable for:


(1)


(2)


(3)





Recent Travel:





PAST MEDICAL HISTORY:





PAST SURGICAL HISTORY:





Social History:


Smoking:


Alcohol:


Drugs: 





Allergies





No Known Allergies Allergy (Verified 07/16/20 10:56)


   








HOME MEDICATIONS:


                                Home Medications











 Medication  Instructions  Recorded


 


Albuterol Sulfate Inhaler - 2 inh PO PRN PRN 10/27/19





[Ventolin Hfa Inhaler -]  


 


Escitalopram Oxalate [Lexapro -] 20 mg PO DAILY 11/29/19


 


Olanzapine [Zyprexa] 10 mg PO DAILY 11/29/19


 


levETIRAcetam [Keppra -] 500 mg PO BID 11/29/19








REVIEW OF SYSTEMS


CONSTITUTIONAL: 


Absent:  fever, chills, diaphoresis, generalized weakness, malaise, loss of 

appetite, weight change


HEENT: 


Absent:  rhinorrhea, nasal congestion, throat pain, throat swelling, difficulty 

swallowing, mouth swelling, ear pain, eye pain, visual changes


CARDIOVASCULAR: 


Absent: chest pain, syncope, palpitations, irregular heart rate, 

lightheadedness, peripheral edema


RESPIRATORY: 


Absent: cough, shortness of breath, dyspnea with exertion, orthopnea, wheezing, 

stridor, hemoptysis


GASTROINTESTINAL:


Absent: abdominal pain, abdominal distension, nausea, vomiting, diarrhea, 

constipation, melena, hematochezia


GENITOURINARY: 


Absent: dysuria, frequency, urgency, hesitancy, hematuria, flank pain, genital 

pain


MUSCULOSKELETAL: 


Absent: myalgia, arthralgia, joint swelling, back pain, neck pain


SKIN: 


Absent: rash, itching, pallor


HEMATOLOGIC/IMMUNOLOGIC: 


Absent: easy bleeding, easy bruising, lymphadenopathy, frequent infections


ENDOCRINE:


Absent: unexplained weight gain, unexplained weight loss, heat intolerance, cold

intolerance


NEUROLOGIC: 


Absent: headache, focal weakness or paresthesias, dizziness, unsteady gait, 

seizure, mental status changes, bladder or bowel incontinence


PSYCHIATRIC: 


Absent: anxiety, depression, suicidal or homicidal ideation, hallucinations.








PHYSICAL EXAMINATION


                               Vital Signs - 24 hr











  07/16/20 07/16/20 07/16/20





  10:52 11:08 14:00


 


Temperature 98.1 F  


 


Pulse Rate 86  


 


Pulse Rate [   89





Right Radial]   


 


Respiratory 22 H  20





Rate   


 


Blood Pressure 153/68  


 


Blood Pressure   148/72





[Left Arm]   


 


O2 Sat by Pulse 98 100 98





Oximetry (%)   














  07/16/20 07/16/20





  18:38 22:00


 


Temperature 98.0 F 99.3 F


 


Pulse Rate  90


 


Pulse Rate [ 86 





Right Radial]  


 


Respiratory 18 18





Rate  


 


Blood Pressure  138/81


 


Blood Pressure 145/68 





[Left Arm]  


 


O2 Sat by Pulse 98 98





Oximetry (%)  











GENERAL: Awake, alert, and fully oriented, in no acute distress.


HEAD: Normal with no signs of trauma.


EYES: Pupils equal, round and reactive to light, extraocular movements intact, 

sclera anicteric, conjunctiva clear. No lid lag.


EARS, NOSE, THROAT: Ears normal, nares patent, oropharynx clear without 

exudates. Moist mucous membranes.


NECK: Normal range of motion, supple without lymphadenopathy, JVD, or masses.


LUNGS: Breath sounds equal, clear to auscultation bilaterally. No wheezes, and 

no crackles. No accessory muscle use.


HEART: Regular rate and rhythm, normal S1 and S2 without murmur, rub or gallop.


ABDOMEN: Soft, nontender, not distended, normoactive bowel sounds, no guarding, 

no rebound, no masses.  No hepatomegaly or  splenomegaly. 


MUSCULOSKELETAL: Normal range of motion at all joints. No bony deformities or 

tenderness. No CVA tenderness.


UPPER EXTREMITIES: 2+ pulses, warm, well-perfused. No cyanosis. No clubbing. No 

peripheral edema.


LOWER EXTREMITIES: 2+ pulses, warm, well-perfused. No calf tenderness. No 

peripheral edema. 


NEUROLOGICAL:  Cranial nerves II-XII intact. Normal speech. Normal gait.


PSYCHIATRIC: Cooperative. Good eye contact. Appropriate mood and affect.


SKIN: Warm, dry, normal turgor, no rashes or lesions noted, normal capillary 

refill. 


                         Laboratory Results - last 24 hr











  07/16/20 07/16/20 07/16/20





  11:35 12:30 12:30


 


WBC   7.5 


 


RBC   4.32 


 


Hgb   11.3 L 


 


Hct   35.7 


 


MCV   82.6 


 


MCH   26.2 


 


MCHC   31.7 L 


 


RDW   15.4 


 


Plt Count   237 


 


MPV   7.5 


 


Absolute Neuts (auto)   4.9 


 


Neutrophils %   64.9  D 


 


Lymphocytes %   20.8  D 


 


Monocytes %   10.1 


 


Eosinophils %   3.8 


 


Basophils %   0.4 


 


Nucleated RBC %   0 


 


PT with INR    11.30


 


INR    0.96


 


PTT (Actin FS)    30.1


 


VBG pH   


 


POC VBG pCO2   


 


POC VBG pO2   


 


VBG HCO3   


 


VBG O2 Sat (Ghazala)   


 


VBG Base Excess   


 


Sodium   


 


Potassium   


 


Chloride   


 


Carbon Dioxide   


 


Anion Gap   


 


BUN   


 


Creatinine   


 


Est GFR (CKD-EPI)AfAm   


 


Est GFR (CKD-EPI)NonAf   


 


Random Glucose   


 


Lactic Acid   


 


Calcium   


 


Total Bilirubin   


 


AST   


 


ALT   


 


Alkaline Phosphatase   


 


Ammonia   


 


Creatine Kinase   


 


Creatine Kinase Index   


 


CK-MB (CK-2)   


 


Troponin I   


 


Total Protein   


 


Albumin   


 


Lipase   


 


TSH   


 


Free T4   


 


Urine Color  Yellow  


 


Urine Appearance  Clear  


 


Urine pH  8.5 H  


 


Ur Specific Gravity  1.005 L  


 


Urine Protein  Negative  


 


Urine Glucose (UA)  Negative  


 


Urine Ketones  Negative  


 


Urine Blood  Negative  


 


Urine Nitrite  Negative  


 


Urine Bilirubin  Negative  


 


Urine Urobilinogen  0.2  


 


Ur Leukocyte Esterase  Negative  


 


Salicylates   


 


Opiates Screen   


 


Methadone Screen   


 


Acetaminophen   


 


Barbiturate Screen   


 


Phencyclidine Screen   


 


Ur Amphetamines Screen   


 


MDMA (Ecstasy) Screen   


 


Benzodiazepines Screen   


 


Cocaine Screen   


 


U Marijuana (THC) Screen   


 


Alcohol, Quantitative   














  07/16/20 07/16/20 07/16/20





  12:30 12:30 12:30


 


WBC   


 


RBC   


 


Hgb   


 


Hct   


 


MCV   


 


MCH   


 


MCHC   


 


RDW   


 


Plt Count   


 


MPV   


 


Absolute Neuts (auto)   


 


Neutrophils %   


 


Lymphocytes %   


 


Monocytes %   


 


Eosinophils %   


 


Basophils %   


 


Nucleated RBC %   


 


PT with INR   


 


INR   


 


PTT (Actin FS)   


 


VBG pH   


 


POC VBG pCO2   


 


POC VBG pO2   


 


VBG HCO3   


 


VBG O2 Sat (Ghazala)   


 


VBG Base Excess   


 


Sodium  143  


 


Potassium  4.5  


 


Chloride  107  


 


Carbon Dioxide  29  


 


Anion Gap  7 L  


 


BUN  21.2 H  


 


Creatinine  0.9  


 


Est GFR (CKD-EPI)AfAm  110.27  


 


Est GFR (CKD-EPI)NonAf  95.14  


 


Random Glucose  110 H  


 


Lactic Acid    1.3


 


Calcium  9.3  


 


Total Bilirubin  0.2  


 


AST  70 H  


 


ALT  50  


 


Alkaline Phosphatase  128 H  


 


Ammonia   28.40 


 


Creatine Kinase  350 H  


 


Creatine Kinase Index  0.8  


 


CK-MB (CK-2)  2.9  


 


Troponin I  < 0.02  


 


Total Protein  7.3  


 


Albumin  3.1 L  


 


Lipase  3287 H  


 


TSH  2.51  


 


Free T4   


 


Urine Color   


 


Urine Appearance   


 


Urine pH   


 


Ur Specific Gravity   


 


Urine Protein   


 


Urine Glucose (UA)   


 


Urine Ketones   


 


Urine Blood   


 


Urine Nitrite   


 


Urine Bilirubin   


 


Urine Urobilinogen   


 


Ur Leukocyte Esterase   


 


Salicylates   


 


Opiates Screen   


 


Methadone Screen   


 


Acetaminophen   


 


Barbiturate Screen   


 


Phencyclidine Screen   


 


Ur Amphetamines Screen   


 


MDMA (Ecstasy) Screen   


 


Benzodiazepines Screen   


 


Cocaine Screen   


 


U Marijuana (THC) Screen   


 


Alcohol, Quantitative   














  07/16/20 07/16/20 07/16/20





  15:30 15:47 17:05


 


WBC   


 


RBC   


 


Hgb   


 


Hct   


 


MCV   


 


MCH   


 


MCHC   


 


RDW   


 


Plt Count   


 


MPV   


 


Absolute Neuts (auto)   


 


Neutrophils %   


 


Lymphocytes %   


 


Monocytes %   


 


Eosinophils %   


 


Basophils %   


 


Nucleated RBC %   


 


PT with INR   


 


INR   


 


PTT (Actin FS)   


 


VBG pH    7.381


 


POC VBG pCO2    50.1


 


POC VBG pO2    56.3 H


 


VBG HCO3    29.0


 


VBG O2 Sat (Ghazala)    88.5 H


 


VBG Base Excess    3.1 H


 


Sodium   


 


Potassium   


 


Chloride   


 


Carbon Dioxide   


 


Anion Gap   


 


BUN   


 


Creatinine   


 


Est GFR (CKD-EPI)AfAm   


 


Est GFR (CKD-EPI)NonAf   


 


Random Glucose   


 


Lactic Acid   


 


Calcium   


 


Total Bilirubin   


 


AST   


 


ALT   


 


Alkaline Phosphatase   


 


Ammonia   


 


Creatine Kinase   


 


Creatine Kinase Index   


 


CK-MB (CK-2)   


 


Troponin I   


 


Total Protein   


 


Albumin   


 


Lipase   


 


TSH   


 


Free T4   0.80 


 


Urine Color   


 


Urine Appearance   


 


Urine pH   


 


Ur Specific Gravity   


 


Urine Protein   


 


Urine Glucose (UA)   


 


Urine Ketones   


 


Urine Blood   


 


Urine Nitrite   


 


Urine Bilirubin   


 


Urine Urobilinogen   


 


Ur Leukocyte Esterase   


 


Salicylates  1.9 L  


 


Opiates Screen   


 


Methadone Screen   


 


Acetaminophen  --noresult--  


 


Barbiturate Screen   


 


Phencyclidine Screen   


 


Ur Amphetamines Screen   


 


MDMA (Ecstasy) Screen   


 


Benzodiazepines Screen   


 


Cocaine Screen   


 


U Marijuana (THC) Screen   


 


Alcohol, Quantitative   < 3 














  07/16/20





  17:58


 


WBC 


 


RBC 


 


Hgb 


 


Hct 


 


MCV 


 


MCH 


 


MCHC 


 


RDW 


 


Plt Count 


 


MPV 


 


Absolute Neuts (auto) 


 


Neutrophils % 


 


Lymphocytes % 


 


Monocytes % 


 


Eosinophils % 


 


Basophils % 


 


Nucleated RBC % 


 


PT with INR 


 


INR 


 


PTT (Actin FS) 


 


VBG pH 


 


POC VBG pCO2 


 


POC VBG pO2 


 


VBG HCO3 


 


VBG O2 Sat (Ghazala) 


 


VBG Base Excess 


 


Sodium 


 


Potassium 


 


Chloride 


 


Carbon Dioxide 


 


Anion Gap 


 


BUN 


 


Creatinine 


 


Est GFR (CKD-EPI)AfAm 


 


Est GFR (CKD-EPI)NonAf 


 


Random Glucose 


 


Lactic Acid 


 


Calcium 


 


Total Bilirubin 


 


AST 


 


ALT 


 


Alkaline Phosphatase 


 


Ammonia 


 


Creatine Kinase 


 


Creatine Kinase Index 


 


CK-MB (CK-2) 


 


Troponin I 


 


Total Protein 


 


Albumin 


 


Lipase 


 


TSH 


 


Free T4 


 


Urine Color 


 


Urine Appearance 


 


Urine pH 


 


Ur Specific Gravity 


 


Urine Protein 


 


Urine Glucose (UA) 


 


Urine Ketones 


 


Urine Blood 


 


Urine Nitrite 


 


Urine Bilirubin 


 


Urine Urobilinogen 


 


Ur Leukocyte Esterase 


 


Salicylates 


 


Opiates Screen  Negative


 


Methadone Screen  Positive A*


 


Acetaminophen 


 


Barbiturate Screen  Negative


 


Phencyclidine Screen  Negative


 


Ur Amphetamines Screen  Negative


 


MDMA (Ecstasy) Screen  Negative


 


Benzodiazepines Screen  Positive A*


 


Cocaine Screen  Negative


 


U Marijuana (THC) Screen  Negative


 


Alcohol, Quantitative 











ASSESSMENT/PLAN:








ATTENDING PHYSICIAN STATEMENT





I saw and evaluated the patient.


I reviewed the resident's note and discussed the case with the resident.


I agree with the resident's findings and plan as documented.








SUBJECTIVE:








OBJECTIVE:








ASSESSMENT AND PLAN:








<Velma Marina - Last Filed: 07/17/20 08:28>


CHIEF COMPLAINT: AMS, sent from 





PCP: None





HISTORY OF PRESENT ILLNESS:


Pt is a poor historian.





55M w/ pmhx of PSA (etoh, cocaine, heroine), on methadone maintenance, seizure 

d/o, schizophrenia, HCV presents to the ED from [facility] detox with AMS x2 

days. Per chart review, he was found to be drowsy with slurred speech. He was 

being treated for hepatic encephalopathy at [facility] and given Lactulose, with 

improvement in Amm level but with worsening mental status. He was at [facility] 

for alcohol detox. Upon encounter, pt was complaining of generalized body pain 

in his chest, abd, back and legs. Also admits to pain in his head on the R side.

Pt was seen walking around the hospital exiting the ED to buy coffee however was

escorted back to the ED by security. Unable to obtain further history as he was 

uncooperative with questioning during interview.





ER course was notable for:


(1) VS unremarkable, Hgb 11.3, AST/ALT 70/50, Alk Sho 128, Lipase 3287


(2)


(3)





Recent Travel: Denies





PAST MEDICAL HISTORY:


As per HPI





PAST SURGICAL HISTORY:


Denies





Social History:


Per chart review: 





Smoking: Smokes 30 cig/day


Alcohol: Drinks vodka 2L daily since age 21 (last drink 7/9/20)


Drugs: uses heroine IV QD, since age 16 (last used 7/9/20)


Unemployed





Allergies





No Known Allergies Allergy (Verified 07/16/20 10:56)


   








HOME MEDICATIONS:


                                Home Medications











 Medication  Instructions  Recorded


 


Albuterol Sulfate Inhaler - 2 inh PO PRN PRN 10/27/19





[Ventolin Hfa Inhaler -]  


 


Escitalopram Oxalate [Lexapro -] 20 mg PO DAILY 11/29/19


 


Olanzapine [Zyprexa] 15 mg PO DAILY 11/29/19


 


levETIRAcetam [Keppra -] 100 mg PO BID 11/29/19








REVIEW OF SYSTEMS


CONSTITUTIONAL: 


Absent:  fever, chills, diaphoresis, generalized weakness, malaise, loss of 

appetite, weight change


HEENT: 


Absent:  rhinorrhea, nasal congestion, throat pain, throat swelling, difficulty 

swallowing, mouth swelling, ear pain, eye pain, visual changes


CARDIOVASCULAR: chest pain


Absent: syncope, palpitations, irregular heart rate, lightheadedness, peripheral

edema


RESPIRATORY: shortness of breath, dyspnea with exertion


Absent: cough, , orthopnea, wheezing, stridor, hemoptysis


GASTROINTESTINAL:


Absent: abdominal pain, abdominal distension, nausea, vomiting, diarrhea, 

constipation, melena, hematochezia


GENITOURINARY: 


Absent: dysuria, frequency, urgency, hesitancy, hematuria, flank pain, genital 

pain


MUSCULOSKELETAL: 


Absent: myalgia, arthralgia, joint swelling, back pain, neck pain


SKIN: 


Absent: rash, itching, pallor


HEMATOLOGIC/IMMUNOLOGIC: 


Absent: easy bleeding, easy bruising, lymphadenopathy, frequent infections


ENDOCRINE:


Absent: unexplained weight gain, unexplained weight loss, heat intolerance, cold

intolerance


NEUROLOGIC: 


Absent: headache, focal weakness or paresthesias, dizziness, unsteady gait, 

seizure, mental status changes, bladder or bowel incontinence


PSYCHIATRIC: 


Absent: anxiety, depression, suicidal or homicidal ideation, hallucinations.








PHYSICAL EXAMINATION


                               Vital Signs - 24 hr











  07/16/20 07/16/20 07/16/20





  10:52 11:08 14:00


 


Temperature 98.1 F  


 


Pulse Rate 86  


 


Pulse Rate [   89





Right Radial]   


 


Respiratory 22 H  20





Rate   


 


Blood Pressure 153/68  


 


Blood Pressure   148/72





[Left Arm]   


 


O2 Sat by Pulse 98 100 98





Oximetry (%)   











GENERAL: Awake and alerted. Oriented to person and place. Mildly uncomfortable.


HEENT: AT/NC. EOMI. MMM. 


NECK: Normal range of motion, supple without lymphadenopathy, JVD, or masses.


LUNGS: Mild end expiratory wheezes, good air entry, symmetric chest rise. No 

rales noted.


HEART: RRR. Normal S1, S2. No murmurs noted.


ABDOMEN: Soft, mild generalized abdominal tenderness. No rebound 

tenderness/guarding. Pt refused to let me look at abdomen under gown.


MUSCULOSKELETAL: Moves all extremities.


EXTREMITIES: No peripheral edema noted. uses a cane.


NEUROLOGICAL:  Cranial nerves II-XII intact. Normal speech. Walks with cane.


PSYCHIATRIC: Cooperative. Good eye contact. Appropriate mood and affect.


SKIN: Warm, dry, normal turgor, no rashes or lesions noted, normal capillary 

refill. 








                         Laboratory Results - last 24 hr











  07/16/20 07/16/20 07/16/20





  11:35 12:30 12:30


 


WBC   7.5 


 


RBC   4.32 


 


Hgb   11.3 L 


 


Hct   35.7 


 


MCV   82.6 


 


MCH   26.2 


 


MCHC   31.7 L 


 


RDW   15.4 


 


Plt Count   237 


 


MPV   7.5 


 


Absolute Neuts (auto)   4.9 


 


Neutrophils %   64.9  D 


 


Lymphocytes %   20.8  D 


 


Monocytes %   10.1 


 


Eosinophils %   3.8 


 


Basophils %   0.4 


 


Nucleated RBC %   0 


 


PT with INR    11.30


 


INR    0.96


 


PTT (Actin FS)    30.1


 


Sodium   


 


Potassium   


 


Chloride   


 


Carbon Dioxide   


 


Anion Gap   


 


BUN   


 


Creatinine   


 


Est GFR (CKD-EPI)AfAm   


 


Est GFR (CKD-EPI)NonAf   


 


Random Glucose   


 


Lactic Acid   


 


Calcium   


 


Total Bilirubin   


 


AST   


 


ALT   


 


Alkaline Phosphatase   


 


Ammonia   


 


Creatine Kinase   


 


Creatine Kinase Index   


 


CK-MB (CK-2)   


 


Troponin I   


 


Total Protein   


 


Albumin   


 


Lipase   


 


TSH   


 


Urine Color  Yellow  


 


Urine Appearance  Clear  


 


Urine pH  8.5 H  


 


Ur Specific Gravity  1.005 L  


 


Urine Protein  Negative  


 


Urine Glucose (UA)  Negative  


 


Urine Ketones  Negative  


 


Urine Blood  Negative  


 


Urine Nitrite  Negative  


 


Urine Bilirubin  Negative  


 


Urine Urobilinogen  0.2  


 


Ur Leukocyte Esterase  Negative  














  07/16/20 07/16/20 07/16/20





  12:30 12:30 12:30


 


WBC   


 


RBC   


 


Hgb   


 


Hct   


 


MCV   


 


MCH   


 


MCHC   


 


RDW   


 


Plt Count   


 


MPV   


 


Absolute Neuts (auto)   


 


Neutrophils %   


 


Lymphocytes %   


 


Monocytes %   


 


Eosinophils %   


 


Basophils %   


 


Nucleated RBC %   


 


PT with INR   


 


INR   


 


PTT (Actin FS)   


 


Sodium  143  


 


Potassium  4.5  


 


Chloride  107  


 


Carbon Dioxide  29  


 


Anion Gap  7 L  


 


BUN  21.2 H  


 


Creatinine  0.9  


 


Est GFR (CKD-EPI)AfAm  110.27  


 


Est GFR (CKD-EPI)NonAf  95.14  


 


Random Glucose  110 H  


 


Lactic Acid    1.3


 


Calcium  9.3  


 


Total Bilirubin  0.2  


 


AST  70 H  


 


ALT  50  


 


Alkaline Phosphatase  128 H  


 


Ammonia   28.40 


 


Creatine Kinase  350 H  


 


Creatine Kinase Index  0.8  


 


CK-MB (CK-2)  2.9  


 


Troponin I  < 0.02  


 


Total Protein  7.3  


 


Albumin  3.1 L  


 


Lipase  3287 H  


 


TSH  2.51  


 


Urine Color   


 


Urine Appearance   


 


Urine pH   


 


Ur Specific Gravity   


 


Urine Protein   


 


Urine Glucose (UA)   


 


Urine Ketones   


 


Urine Blood   


 


Urine Nitrite   


 


Urine Bilirubin   


 


Urine Urobilinogen   


 


Ur Leukocyte Esterase   











ASSESSMENT/PLAN:


55M w/ pmhx of PSA (etoh, cocaine, heroine), on methadone maintenance, seizure 

d/o, schizophrenia, HCV presents to the ED from [facility] detox with AMS.





#Acute Metabolic Encephalopathy; may be 2/2 Methadone dosage vs. hepatic 

encephalopathy vs. substance withdrawal


-Given Narcan upon arrival, with improvement in mental status


-Utox ordered


-Detox specialist consulted to optimize Methadone dosage


-Ammonia level previously elevated, was on Lactulose at [facility]; will cont 

Lactulose 20 QID.





#Acute Pancreatitis; Lipase ~3200


-Clinically, no significant abd pain


-Cont diet as tolerated


-NS @ 75


-Repeat lipase in AM


-Abd U/s ordered





#Transaminitis; AST/ALT 70/50. Likely 2/2 alcoholic liver disease.


-Abd u/s ordered


-trend LFTs


-Avoid hepatoxic agents





#Seizure disorder; No acute issues. Cont home meds: Keppra 500 BID





#Polysubstance Abuse D/o; s/p Alcohol detox (with Librium), now on Methadone. No

signs of withdrawal currently.


-Detox specialist consulted for Methadone dosage adjustment


-Thiamine/Folate/MVI


-Ativan 2 mg IVP Q4H PRN for withdrawal symptoms





#Schizophrenia; No acute issues. Cont home meds: Zyprexa 10 HS   





#Prophylaxis


DVT: Lovenox


GI: Protonix 40





#FEN


-NS @ 75


-recheck lytes in AM


-Regular diet





Dispo


-Admit to med-surg





Visit type





- Emergency Visit


Emergency Visit: Yes


ED Registration Date: 07/16/20


Care time: The patient presented to the Emergency Department on the above date 

and was hospitalized for further evaluation of their emergent condition.





- New Patient


This patient is new to me today: Yes


Date on this admission: 07/16/20





- Critical Care


Critical Care patient: No





ATTENDING PHYSICIAN STATEMENT





I saw and evaluated the patient.


I reviewed the resident's note and discussed the case with the resident.


I agree with the resident's findings and plan as documented.








SUBJECTIVE:








OBJECTIVE:








ASSESSMENT AND PLAN:

## 2020-07-16 NOTE — PN
[facility] CIWA





- CIWA Score


Nausea/Vomitin-No Nausea/No Vomiting


Muscle Tremors: None


Anxiety: 0-No Anxiety, at Ease


Agitation: 0-Normal Activity


Paroxysmal Sweats: No Perspiration


Orientation: 1-Uncertain about Date


Tacttile Disturbances: 0-None


Auditory Disturbances: 0-None


Visual Disturbances: 0-None


Headache: 0-None Present


CIWA-Ar Total Score: 1





[facility] Progress Note (SOAP)


Subjective: 





alter mental status ,slurred speech,drowsy


Objective: 





20 09:10


                                   Vital Signs











Temperature  97.5 F L  20 06:41


 


Pulse Rate  79   20 06:41


 


Respiratory Rate  16   20 06:41


 


Blood Pressure  97/78   20 06:41


 


O2 Sat by Pulse Oximetry (%)  98   07/15/20 20:46











Assessment: 





20 09:10


alter mental status,


Plan: 





transfer to [facility] er for evaluation and treatment,for alter mental status,r/o 

hepatic encephalopathy,no improvement with lactulose 20 grams po qid treatment,


endorsed to DR Alicia Herrmann

## 2020-07-16 NOTE — PN
Teaching Attending Note


Name of Resident: Terrence Omalley





ATTENDING PHYSICIAN STATEMENT





I saw and evaluated the patient.


I reviewed the resident's note and discussed the case with the resident.


I agree with the resident's findings and plan as documented.








SUBJECTIVE: Altered mental status








OBJECTIVE:


                                   Vital Signs











Temperature  98.1 F   07/16/20 10:52


 


Pulse Rate  89   07/16/20 14:00


 


Respiratory Rate  20   07/16/20 14:00


 


Blood Pressure  148/72   07/16/20 14:00


 


O2 Sat by Pulse Oximetry (%)  98   07/16/20 14:00








General: Young man, comfortable, not in distress


HEENT mucous membranes moist, no anemia, no jaundice, PERRLA, no nystagmus


Neck: No JVD, supple, no bruit, thyroid palpably normal, normal carotid 

pulsations.


Chest: Nontender, minimal wheezes 


CVS: S1-S2 regular no murmur gallop or rub


Abdomen: Mild diffuse pain, no guarding no nondistended, soft, bowel sounds 

present.


Extremities: Trace edema.,  No Calf tenderness, pulses present


CNS: Alert oriented x2 walking comfortably in the corridor with a cane denies 

any hallucination or delusion








CBC,CMP











WBC  7.5 K/mm3 (4.0-10.0)   07/16/20  12:30    


 


RBC  4.32 M/mm3 (4.00-5.60)   07/16/20  12:30    


 


Hgb  11.3 GM/dL (11.7-16.9)  L  07/16/20  12:30    


 


Hct  35.7 % (35.4-49)   07/16/20  12:30    


 


MCV  82.6 fl (80-96)   07/16/20  12:30    


 


MCH  26.2 pg (25.7-33.7)   07/16/20  12:30    


 


MCHC  31.7 g/dl (32.0-35.9)  L  07/16/20  12:30    


 


RDW  15.4 % (11.9-15.9)   07/16/20  12:30    


 


Plt Count  237 K/MM3 (134-434)   07/16/20  12:30    


 


MPV  7.5 fl (7.5-11.1)   07/16/20  12:30    


 


Absolute Neuts (auto)  4.9 K/mm3 (1.5-8.0)   07/16/20  12:30    


 


Neutrophils %  64.9 % (42.8-82.8)  D 07/16/20  12:30    


 


Lymphocytes %  20.8 % (8-40)  D 07/16/20  12:30    


 


Monocytes %  10.1 % (3.8-10.2)   07/16/20  12:30    


 


Eosinophils %  3.8 % (0-4.5)   07/16/20  12:30    


 


Basophils %  0.4 % (0-2.0)   07/16/20  12:30    


 


Nucleated RBC %  0 % (0-0)   07/16/20  12:30    


 


Sodium  143 mmol/L (136-145)   07/16/20  12:30    


 


Potassium  4.5 mmol/L (3.5-5.1)   07/16/20  12:30    


 


Chloride  107 mmol/L ()   07/16/20  12:30    


 


Carbon Dioxide  29 mmol/L (21-32)   07/16/20  12:30    


 


Anion Gap  7 MMOL/L (8-16)  L  07/16/20  12:30    


 


BUN  21.2 mg/dL (7-18)  H  07/16/20  12:30    


 


Creatinine  0.9 mg/dL (0.55-1.3)   07/16/20  12:30    


 


Est GFR (CKD-EPI)AfAm  110.27   07/16/20  12:30    


 


Est GFR (CKD-EPI)NonAf  95.14   07/16/20  12:30    


 


Random Glucose  110 mg/dL ()  H  07/16/20  12:30    


 


Lactic Acid  1.3 mmol/L (0.4-2.0)   07/16/20  12:30    


 


Calcium  9.3 mg/dL (8.5-10.1)   07/16/20  12:30    


 


Total Bilirubin  0.2 mg/dL (0.2-1)   07/16/20  12:30    


 


AST  70 U/L (15-37)  H  07/16/20  12:30    


 


ALT  50 U/L (13-61)   07/16/20  12:30    


 


Alkaline Phosphatase  128 U/L ()  H  07/16/20  12:30    


 


Ammonia  28.40 umol/L (11-32)   07/16/20  12:30    


 


Creatine Kinase  350 U/L ()  H  07/16/20  12:30    


 


Creatine Kinase Index  0.8 % (0.0-5.0)   07/16/20  12:30    


 


CK-MB (CK-2)  2.9 ng/mL (0.5-3.6)   07/16/20  12:30    


 


Troponin I  < 0.02 ng/ml (0.00-0.05)   07/16/20  12:30    


 


Total Protein  7.3 g/dl (6.4-8.2)   07/16/20  12:30    


 


Albumin  3.1 g/dl (3.4-5.0)  L  07/16/20  12:30    


 


Lipase  3287 U/L ()  H  07/16/20  12:30    


 


TSH  2.51 uIU/ml (0.358-3.74)   07/16/20  12:30    











Ammonia level 28:





UA normal:


Chest x-ray: Normal


CT head: No acute changes


EKG 79 NSR  no acute ST-T changes





U tox pending


ASSESSMENT AND PLAN: 55 years old man past medical history of polysubstance 

abuse cocaine alcohol, heroin abuse seizure disorder on Keppra transferred from 

[facility] change in mental status, patient was admitted on July 10, 2020 with 

alcohol intoxication for detox on hospitalization ammonia level was 79, patient 

was doing well put on methadone maintenance dose of 125 mg/day, today patient 

become drowsy after taking 125 mg methadone transfer to [facility] ED for further 

evaluation and management, at the time of examination patient complained of 

abdominal pain blood work-up shows elevated lipase level of 3287, walking in the

corridor last BM was this morning.








Active issues:


1.  Altered mental status: Most likely due to high-dose of methadone improved 

after Narcan now alert oriented, will observe clinically, addiction medicine 

consult to optimize methadone dose.


2.  Acute pancreatitis: Most likely resolving acute pancreatitis still elevated 

lipase, no acute abdominal sign, continue p.o. feed as tolerates, pain control, 

IV hydration, follow-up lipase level and abdominal ultrasound.


3.  Seizure disorders:; Continue Keppra, lorazepam 2 mg IV as needed 

breakthrough seizure


4.  Alcohol detox: Completed 6 days, Librium can be added if indicated advised 

patient is stable no clinical sign of alcohol withdrawal.  Continue thiamine and

folic acid.


5.  Transaminitis: Most likely due to alcoholic liver disease, consider 

abdominal ultrasound, hepatitis panel, follow-up LFTs ammonia level is 28 

continue lactulose.





COPD: Patient most likely have COPD albuterol MDI as needed





SCD for DVT prophylaxis:





Case discussed with the resident

## 2020-07-17 NOTE — HOSP
Subjective





- Review of Symptoms


General: Yes: Other


Neurological: Yes: Other





Physical Examination


Vital Signs: 


                                   Vital Signs











Temperature  99.3 F   07/16/20 22:00


 


Pulse Rate  90   07/16/20 22:00


 


Respiratory Rate  18   07/16/20 22:00


 


Blood Pressure  138/81   07/16/20 22:00


 


O2 Sat by Pulse Oximetry (%)  98   07/16/20 22:00











Labs: 


                                    CBC, BMP





                                 07/16/20 12:30 





                                 07/16/20 12:30

## 2020-07-17 NOTE — PN
Physical Exam: 


SUBJECTIVE: 


Patient seen and examined at the bedside.  exam limited a patient was anxious 

and wanted to  leave AMA.  was very upset about an incident that occurred at  

and that he felt he was mistreated there. patient [facility] me [facility] and was sent here by staff. reports that he had a bad experience at  and 

no longer wants to be in the hospital. 





he tells me his name, knows where he is and who the president is.  he knows 

date, and time.  tells me  he is aware of his pancreatitis but does not want any

treatment.  wants to go home and he says he may come back if he feels worse. 





recommended to him that he stay in the hospital for acute pancreatitis treatment

but he is refusing. 


he is anxious and upset.  denies suicide ideation, denies homicidal ideation. 





attempted to call his contact listed on emr: went to , no message left


asked patient to let me call his wife, but he refused to give me any family 

member phone no.  he is listed as homeless on emr.  I asked him to stay and eat 

breakfast, and allow for us to help him with his medical issues.  he does not 

want to stay and kept going to nursing station asking for his paperwork so that 

he can sign ama.  he has been reassured that he is safe here.  but he insists on

leaving.  


security attempting to retrieve his personal belongings from .





OBJECTIVE:


                                   Vital Signs











 Period  Temp  Pulse  Resp  BP Sys/Gibbs  Pulse Ox


 


 Last 24 Hr  98.0 F-99.3 F  86-90  18-22  138-153/68-81  








GENERAL: The patient is awake, alert, oriented to person, place and time very 

anxious - reassured he was safe here. but wants to leave.


HEAD: Normal with no signs of trauma.


EYES: PERRL, extraocular movements intact, sclera anicteric, conjunctiva clear. 

No ptosis. 


ENT: Ears normal, nares patent, oropharynx clear without exudates, moist mucous 

membranes.


NECK: Trachea midline, full range of motion, supple. 


LUNGS: deferred lung auscultation, on room air


HEART: not auscultated


ABDOMEN: nondistended


EXTREMITIES: 2+ pulses, warm, well-perfused, no edema. 


NEUROLOGICAL: Normal speech, gait steady


PSYCH: anxious


SKIN:numerous tattoos





                         Laboratory Results - last 24 hr











  07/16/20 07/16/20 07/16/20





  11:35 12:30 12:30


 


WBC   7.5 


 


RBC   4.32 


 


Hgb   11.3 L 


 


Hct   35.7 


 


MCV   82.6 


 


MCH   26.2 


 


MCHC   31.7 L 


 


RDW   15.4 


 


Plt Count   237 


 


MPV   7.5 


 


Absolute Neuts (auto)   4.9 


 


Neutrophils %   64.9  D 


 


Lymphocytes %   20.8  D 


 


Monocytes %   10.1 


 


Eosinophils %   3.8 


 


Basophils %   0.4 


 


Nucleated RBC %   0 


 


PT with INR    11.30


 


INR    0.96


 


PTT (Actin FS)    30.1


 


VBG pH   


 


POC VBG pCO2   


 


POC VBG pO2   


 


VBG HCO3   


 


VBG O2 Sat (Ghazala)   


 


VBG Base Excess   


 


Sodium   


 


Potassium   


 


Chloride   


 


Carbon Dioxide   


 


Anion Gap   


 


BUN   


 


Creatinine   


 


Est GFR (CKD-EPI)AfAm   


 


Est GFR (CKD-EPI)NonAf   


 


Random Glucose   


 


Lactic Acid   


 


Calcium   


 


Total Bilirubin   


 


AST   


 


ALT   


 


Alkaline Phosphatase   


 


Ammonia   


 


Creatine Kinase   


 


Creatine Kinase Index   


 


CK-MB (CK-2)   


 


Troponin I   


 


Total Protein   


 


Albumin   


 


Lipase   


 


TSH   


 


Free T4   


 


Urine Color  Yellow  


 


Urine Appearance  Clear  


 


Urine pH  8.5 H  


 


Ur Specific Gravity  1.005 L  


 


Urine Protein  Negative  


 


Urine Glucose (UA)  Negative  


 


Urine Ketones  Negative  


 


Urine Blood  Negative  


 


Urine Nitrite  Negative  


 


Urine Bilirubin  Negative  


 


Urine Urobilinogen  0.2  


 


Ur Leukocyte Esterase  Negative  


 


Salicylates   


 


Opiates Screen   


 


Methadone Screen   


 


Acetaminophen   


 


Barbiturate Screen   


 


Phencyclidine Screen   


 


Ur Amphetamines Screen   


 


MDMA (Ecstasy) Screen   


 


Benzodiazepines Screen   


 


Cocaine Screen   


 


U Marijuana (THC) Screen   


 


Alcohol, Quantitative   














  07/16/20 07/16/20 07/16/20





  12:30 12:30 12:30


 


WBC   


 


RBC   


 


Hgb   


 


Hct   


 


MCV   


 


MCH   


 


MCHC   


 


RDW   


 


Plt Count   


 


MPV   


 


Absolute Neuts (auto)   


 


Neutrophils %   


 


Lymphocytes %   


 


Monocytes %   


 


Eosinophils %   


 


Basophils %   


 


Nucleated RBC %   


 


PT with INR   


 


INR   


 


PTT (Actin FS)   


 


VBG pH   


 


POC VBG pCO2   


 


POC VBG pO2   


 


VBG HCO3   


 


VBG O2 Sat (Ghazala)   


 


VBG Base Excess   


 


Sodium  143  


 


Potassium  4.5  


 


Chloride  107  


 


Carbon Dioxide  29  


 


Anion Gap  7 L  


 


BUN  21.2 H  


 


Creatinine  0.9  


 


Est GFR (CKD-EPI)AfAm  110.27  


 


Est GFR (CKD-EPI)NonAf  95.14  


 


Random Glucose  110 H  


 


Lactic Acid    1.3


 


Calcium  9.3  


 


Total Bilirubin  0.2  


 


AST  70 H  


 


ALT  50  


 


Alkaline Phosphatase  128 H  


 


Ammonia   28.40 


 


Creatine Kinase  350 H  


 


Creatine Kinase Index  0.8  


 


CK-MB (CK-2)  2.9  


 


Troponin I  < 0.02  


 


Total Protein  7.3  


 


Albumin  3.1 L  


 


Lipase  3287 H  


 


TSH  2.51  


 


Free T4   


 


Urine Color   


 


Urine Appearance   


 


Urine pH   


 


Ur Specific Gravity   


 


Urine Protein   


 


Urine Glucose (UA)   


 


Urine Ketones   


 


Urine Blood   


 


Urine Nitrite   


 


Urine Bilirubin   


 


Urine Urobilinogen   


 


Ur Leukocyte Esterase   


 


Salicylates   


 


Opiates Screen   


 


Methadone Screen   


 


Acetaminophen   


 


Barbiturate Screen   


 


Phencyclidine Screen   


 


Ur Amphetamines Screen   


 


MDMA (Ecstasy) Screen   


 


Benzodiazepines Screen   


 


Cocaine Screen   


 


U Marijuana (THC) Screen   


 


Alcohol, Quantitative   














  07/16/20 07/16/20 07/16/20





  15:30 15:47 17:05


 


WBC   


 


RBC   


 


Hgb   


 


Hct   


 


MCV   


 


MCH   


 


MCHC   


 


RDW   


 


Plt Count   


 


MPV   


 


Absolute Neuts (auto)   


 


Neutrophils %   


 


Lymphocytes %   


 


Monocytes %   


 


Eosinophils %   


 


Basophils %   


 


Nucleated RBC %   


 


PT with INR   


 


INR   


 


PTT (Actin FS)   


 


VBG pH    7.381


 


POC VBG pCO2    50.1


 


POC VBG pO2    56.3 H


 


VBG HCO3    29.0


 


VBG O2 Sat (Ghazala)    88.5 H


 


VBG Base Excess    3.1 H


 


Sodium   


 


Potassium   


 


Chloride   


 


Carbon Dioxide   


 


Anion Gap   


 


BUN   


 


Creatinine   


 


Est GFR (CKD-EPI)AfAm   


 


Est GFR (CKD-EPI)NonAf   


 


Random Glucose   


 


Lactic Acid   


 


Calcium   


 


Total Bilirubin   


 


AST   


 


ALT   


 


Alkaline Phosphatase   


 


Ammonia   


 


Creatine Kinase   


 


Creatine Kinase Index   


 


CK-MB (CK-2)   


 


Troponin I   


 


Total Protein   


 


Albumin   


 


Lipase   


 


TSH   


 


Free T4   0.80 


 


Urine Color   


 


Urine Appearance   


 


Urine pH   


 


Ur Specific Gravity   


 


Urine Protein   


 


Urine Glucose (UA)   


 


Urine Ketones   


 


Urine Blood   


 


Urine Nitrite   


 


Urine Bilirubin   


 


Urine Urobilinogen   


 


Ur Leukocyte Esterase   


 


Salicylates  1.9 L  


 


Opiates Screen   


 


Methadone Screen   


 


Acetaminophen  --noresult--  


 


Barbiturate Screen   


 


Phencyclidine Screen   


 


Ur Amphetamines Screen   


 


MDMA (Ecstasy) Screen   


 


Benzodiazepines Screen   


 


Cocaine Screen   


 


U Marijuana (THC) Screen   


 


Alcohol, Quantitative   < 3 














  07/16/20





  17:58


 


WBC 


 


RBC 


 


Hgb 


 


Hct 


 


MCV 


 


MCH 


 


MCHC 


 


RDW 


 


Plt Count 


 


MPV 


 


Absolute Neuts (auto) 


 


Neutrophils % 


 


Lymphocytes % 


 


Monocytes % 


 


Eosinophils % 


 


Basophils % 


 


Nucleated RBC % 


 


PT with INR 


 


INR 


 


PTT (Actin FS) 


 


VBG pH 


 


POC VBG pCO2 


 


POC VBG pO2 


 


VBG HCO3 


 


VBG O2 Sat (Ghazala) 


 


VBG Base Excess 


 


Sodium 


 


Potassium 


 


Chloride 


 


Carbon Dioxide 


 


Anion Gap 


 


BUN 


 


Creatinine 


 


Est GFR (CKD-EPI)AfAm 


 


Est GFR (CKD-EPI)NonAf 


 


Random Glucose 


 


Lactic Acid 


 


Calcium 


 


Total Bilirubin 


 


AST 


 


ALT 


 


Alkaline Phosphatase 


 


Ammonia 


 


Creatine Kinase 


 


Creatine Kinase Index 


 


CK-MB (CK-2) 


 


Troponin I 


 


Total Protein 


 


Albumin 


 


Lipase 


 


TSH 


 


Free T4 


 


Urine Color 


 


Urine Appearance 


 


Urine pH 


 


Ur Specific Gravity 


 


Urine Protein 


 


Urine Glucose (UA) 


 


Urine Ketones 


 


Urine Blood 


 


Urine Nitrite 


 


Urine Bilirubin 


 


Urine Urobilinogen 


 


Ur Leukocyte Esterase 


 


Salicylates 


 


Opiates Screen  Negative


 


Methadone Screen  Positive A*


 


Acetaminophen 


 


Barbiturate Screen  Negative


 


Phencyclidine Screen  Negative


 


Ur Amphetamines Screen  Negative


 


MDMA (Ecstasy) Screen  Negative


 


Benzodiazepines Screen  Positive A*


 


Cocaine Screen  Negative


 


U Marijuana (THC) Screen  Negative


 


Alcohol, Quantitative 








Active Medications











Generic Name Dose Route Start Last Admin





  Trade Name Freq  PRN Reason Stop Dose Admin


 


Enoxaparin Sodium  40 mg  07/16/20 18:00  07/16/20 19:21





  Lovenox -  SQ   40 mg





  DAILY REAL   Administration


 


Folic Acid  1 mg  07/16/20 18:45  07/16/20 19:21





  Folic Acid -  PO   1 mg





  DAILY REAL   Administration


 


Sodium Chloride  1,000 mls @ 75 mls/hr  07/16/20 18:45  07/16/20 22:34





  Normal Saline -  IV   75 mls/hr





  ASDIR REAL   Administration


 


Lactulose  20 gm  07/16/20 18:48 





  Cephulac (Oral Use)  PO  





  Q6H PRN  





  CONSTIPATION  


 


Levetiracetam  500 mg  07/16/20 22:00  07/16/20 22:33





  Keppra -  PO   500 mg





  BID REAL   Administration


 


Lorazepam  2 mg  07/16/20 18:41  07/17/20 00:18





  Ativan Injection -  IVPUSH   2 mg





  Q4H PRN   Administration





  WITHDRAWAL(CONT SUBST)  


 


Multivitamins/Minerals/Vitamin C  1 tab  07/16/20 18:45  07/16/20 19:21





  Tab-A-Vit -  PO   1 tab





  DAILY REAL   Administration


 


Olanzapine  10 mg  07/17/20 10:00 





  Zyprexa -  PO  





  DAILY REAL  


 


Pantoprazole Sodium  40 mg  07/16/20 18:45  07/16/20 19:21





  Protonix -  PO   40 mg





  DAILY REAL   Administration


 


Thiamine HCl  100 mg  07/16/20 18:45  07/16/20 19:21





  Vitamin B1 -  PO   100 mg





  DAILY REAL   Administration











ASSESSMENT/PLAN:








Problem List





- Problems


(1) Pancreatitis


Code(s): K85.90 - ACUTE PANCREATITIS WITHOUT NECROSIS OR INFECTION, UNSP   





(2) Elevated lipase


Code(s): R74.8 - ABNORMAL LEVELS OF OTHER SERUM ENZYMES   





(3) Use of cane as ambulatory aid


Code(s): Z99.89 - DEPENDENCE ON OTHER ENABLING MACHINES AND DEVICES   





(4) Methadone maintenance therapy patient


Code(s): F11.20 - OPIOID DEPENDENCE, UNCOMPLICATED   





Visit type





- Emergency Visit


Emergency Visit: Yes


ED Registration Date: 07/16/20


Care time: The patient presented to the Emergency Department on the above date 

and was hospitalized for further evaluation of their emergent condition.





- New Patient


This patient is new to me today: Yes


Date on this admission: 07/21/20





- Critical Care


Critical Care patient: No





- Discharge Referral


Referred to [facility] No

## 2020-07-17 NOTE — CONSULT
Consult Detox [facility]


Reason for Current Admission/Consult: Alcohol dependence





- History


History of Present Illness: 





55 y.o.  male here for alcohol detox. self referred. known to program. 

last here 11/29/19 for detox. client reports relapsing right after dc. drinking 

daily all day. + eye opener, black outs, denies seizures, avh, si. he is also on

mmtp at [facility]. reported dose 120 mg. last dose today pending 

verification. he still abusing heroin using 3 bundles daily via ivdu. he speed b

alls with cocaine. denies any clean time in the past 12 months. lives with 

family, unemployed, denies legals.


His progress in detox was uneventful other than worsening mental status and was 

then transferred to [facility] for worsening mental status despite treatment for 

hepatic encephalopathy with lactulose.  He has basically completed alcohol detox

now.  He is now in [facility] for Pancreatitis with elevated lipase levels





- History Source


History Provided By: Medical Record, Transfer Record


Limitations to Obtaining History: No Limitations





- Alcohol/Substance Use


Hx Alcohol Use: Yes





- Current Drug/Alcohol Use


  ** Alcohol


Route: Oral


Frequency: Daily


Amount used: 3 pints daily


Age of first use: 21


Date of Last Use: 07/09/20





  ** Heroin


Route: Injection


Frequency: Daily


Amount used: 3 bundles


Age of first use: 16


Date of Last Use: 07/09/20





- Past Surgical History


Past Surgical History: Yes: None





Assessment Plan





- Diagnosis


(1) Alcohol dependence with uncomplicated withdrawal


Status: Acute   





(2) Altered mental status


Status: Acute   





(3) Asthma


Status: Acute   


Qualifiers: 


   Asthma severity: mild   Asthma persistence: intermittent 





(4) Elevated lipase


Status: Acute   





(5) Schizophrenia


Status: Acute   





(6) Cannabis dependence


Status: Chronic   





- Plan


Plan: 





1. Alcohol dependence :


S/P completion of detox for alcohol.


There is no need to continue librium.


For now only Ativan doses as needed.


When medically stable can be offered rehab.


2. Opioid Dependence on Agonist Therapy.


Continue Methadone 120mg daily.  If mental status persists, considering 

decreasing dose.


2. Pancreatitis/ Hepatic Encephalopathy due to untreated HCV:


Supportive treatment and follow internal medicine recommendations.


Patient when stabilized from bout of pancreatitis and hepatic encephalopathy can

be offered rehab at [facility].


Dr. Ambrose

## 2021-05-10 ENCOUNTER — HOSPITAL ENCOUNTER (INPATIENT)
Dept: HOSPITAL 74 - YASAS | Age: 56
LOS: 4 days | Discharge: OTHER INSTITUTION | DRG: 773 | End: 2021-05-14
Attending: ALLERGY & IMMUNOLOGY | Admitting: ALLERGY & IMMUNOLOGY
Payer: COMMERCIAL

## 2021-05-10 VITALS — BODY MASS INDEX: 35.1 KG/M2

## 2021-05-10 DIAGNOSIS — F11.20: ICD-10-CM

## 2021-05-10 DIAGNOSIS — G89.29: ICD-10-CM

## 2021-05-10 DIAGNOSIS — F13.20: ICD-10-CM

## 2021-05-10 DIAGNOSIS — B18.2: ICD-10-CM

## 2021-05-10 DIAGNOSIS — F17.210: ICD-10-CM

## 2021-05-10 DIAGNOSIS — Z87.442: ICD-10-CM

## 2021-05-10 DIAGNOSIS — J45.20: ICD-10-CM

## 2021-05-10 DIAGNOSIS — D64.9: ICD-10-CM

## 2021-05-10 DIAGNOSIS — F10.230: Primary | ICD-10-CM

## 2021-05-10 DIAGNOSIS — F19.24: ICD-10-CM

## 2021-05-10 DIAGNOSIS — M54.5: ICD-10-CM

## 2021-05-10 DIAGNOSIS — F19.280: ICD-10-CM

## 2021-05-10 DIAGNOSIS — R94.8: ICD-10-CM

## 2021-05-10 DIAGNOSIS — G40.509: ICD-10-CM

## 2021-05-10 DIAGNOSIS — F19.282: ICD-10-CM

## 2021-05-10 DIAGNOSIS — R03.0: ICD-10-CM

## 2021-05-10 DIAGNOSIS — F14.20: ICD-10-CM

## 2021-05-10 DIAGNOSIS — K29.70: ICD-10-CM

## 2021-05-10 DIAGNOSIS — Z99.89: ICD-10-CM

## 2021-05-10 PROCEDURE — HZ2ZZZZ DETOXIFICATION SERVICES FOR SUBSTANCE ABUSE TREATMENT: ICD-10-PCS | Performed by: ALLERGY & IMMUNOLOGY

## 2021-05-10 PROCEDURE — U0003 INFECTIOUS AGENT DETECTION BY NUCLEIC ACID (DNA OR RNA); SEVERE ACUTE RESPIRATORY SYNDROME CORONAVIRUS 2 (SARS-COV-2) (CORONAVIRUS DISEASE [COVID-19]), AMPLIFIED PROBE TECHNIQUE, MAKING USE OF HIGH THROUGHPUT TECHNOLOGIES AS DESCRIBED BY CMS-2020-01-R: HCPCS

## 2021-05-10 PROCEDURE — C9803 HOPD COVID-19 SPEC COLLECT: HCPCS

## 2021-05-10 PROCEDURE — U0005 INFEC AGEN DETEC AMPLI PROBE: HCPCS

## 2021-05-10 RX ADMIN — Medication SCH: at 23:29

## 2021-05-11 LAB
ALBUMIN SERPL-MCNC: 3.1 G/DL (ref 3.4–5)
ALP SERPL-CCNC: 174 U/L (ref 45–117)
ALT SERPL-CCNC: 188 U/L (ref 13–61)
ANION GAP SERPL CALC-SCNC: 4 MMOL/L (ref 8–16)
AST SERPL-CCNC: 321 U/L (ref 15–37)
BILIRUB SERPL-MCNC: 0.9 MG/DL (ref 0.2–1)
BUN SERPL-MCNC: 9.8 MG/DL (ref 7–18)
CALCIUM SERPL-MCNC: 8.2 MG/DL (ref 8.5–10.1)
CHLORIDE SERPL-SCNC: 103 MMOL/L (ref 98–107)
CO2 SERPL-SCNC: 29 MMOL/L (ref 21–32)
CREAT SERPL-MCNC: 0.7 MG/DL (ref 0.55–1.3)
DEPRECATED RDW RBC AUTO: 14.5 % (ref 11.9–15.9)
GLUCOSE SERPL-MCNC: 88 MG/DL (ref 74–106)
HCT VFR BLD CALC: 32.8 % (ref 35.4–49)
HGB BLD-MCNC: 10.9 GM/DL (ref 11.7–16.9)
MCH RBC QN AUTO: 29.1 PG (ref 25.7–33.7)
MCHC RBC AUTO-ENTMCNC: 33.4 G/DL (ref 32–35.9)
MCV RBC: 87 FL (ref 80–96)
PLATELET # BLD AUTO: 166 K/MM3 (ref 134–434)
PMV BLD: 8.8 FL (ref 7.5–11.1)
PROT SERPL-MCNC: 6.8 G/DL (ref 6.4–8.2)
RBC # BLD AUTO: 3.77 M/MM3 (ref 4–5.6)
SODIUM SERPL-SCNC: 137 MMOL/L (ref 136–145)
WBC # BLD AUTO: 5 K/MM3 (ref 4–10)

## 2021-05-11 RX ADMIN — HYDROXYZINE PAMOATE PRN MG: 25 CAPSULE ORAL at 03:10

## 2021-05-11 RX ADMIN — Medication SCH: at 22:19

## 2021-05-11 RX ADMIN — NICOTINE SCH MG: 21 PATCH TRANSDERMAL at 10:12

## 2021-05-11 RX ADMIN — Medication SCH TAB: at 10:13

## 2021-05-11 RX ADMIN — Medication SCH MG: at 22:19

## 2021-05-12 LAB
ALBUMIN SERPL-MCNC: 3.3 G/DL (ref 3.4–5)
ALP SERPL-CCNC: 178 U/L (ref 45–117)
ALT SERPL-CCNC: 168 U/L (ref 13–61)
ANION GAP SERPL CALC-SCNC: 5 MMOL/L (ref 8–16)
AST SERPL-CCNC: 247 U/L (ref 15–37)
BILIRUB SERPL-MCNC: 0.8 MG/DL (ref 0.2–1)
BUN SERPL-MCNC: 10.5 MG/DL (ref 7–18)
CALCIUM SERPL-MCNC: 8.9 MG/DL (ref 8.5–10.1)
CHLORIDE SERPL-SCNC: 102 MMOL/L (ref 98–107)
CO2 SERPL-SCNC: 29 MMOL/L (ref 21–32)
CREAT SERPL-MCNC: 0.7 MG/DL (ref 0.55–1.3)
GLUCOSE SERPL-MCNC: 86 MG/DL (ref 74–106)
INR BLD: 1.03 (ref 0.83–1.09)
PROT SERPL-MCNC: 7.2 G/DL (ref 6.4–8.2)
PT PNL PPP: 12.7 SEC (ref 9.7–13)
SODIUM SERPL-SCNC: 136 MMOL/L (ref 136–145)

## 2021-05-12 RX ADMIN — METHADONE HYDROCHLORIDE SCH MG: 40 TABLET ORAL at 05:42

## 2021-05-12 RX ADMIN — Medication SCH MG: at 23:06

## 2021-05-12 RX ADMIN — IBUPROFEN PRN MG: 400 TABLET, FILM COATED ORAL at 18:42

## 2021-05-12 RX ADMIN — Medication SCH MG: at 22:06

## 2021-05-12 RX ADMIN — Medication SCH TAB: at 10:31

## 2021-05-12 RX ADMIN — METHOCARBAMOL PRN MG: 500 TABLET ORAL at 18:42

## 2021-05-12 RX ADMIN — NICOTINE SCH MG: 21 PATCH TRANSDERMAL at 10:31

## 2021-05-13 RX ADMIN — Medication SCH MG: at 22:34

## 2021-05-13 RX ADMIN — Medication SCH MG: at 22:33

## 2021-05-13 RX ADMIN — METHOCARBAMOL PRN MG: 500 TABLET ORAL at 12:31

## 2021-05-13 RX ADMIN — NICOTINE SCH MG: 21 PATCH TRANSDERMAL at 10:27

## 2021-05-13 RX ADMIN — HYDROXYZINE PAMOATE PRN MG: 25 CAPSULE ORAL at 22:33

## 2021-05-13 RX ADMIN — IBUPROFEN PRN MG: 400 TABLET, FILM COATED ORAL at 22:34

## 2021-05-13 RX ADMIN — Medication SCH TAB: at 10:27

## 2021-05-13 RX ADMIN — METHADONE HYDROCHLORIDE SCH MG: 40 TABLET ORAL at 05:25

## 2021-05-13 RX ADMIN — METHOCARBAMOL PRN MG: 500 TABLET ORAL at 22:33

## 2021-05-13 RX ADMIN — IBUPROFEN PRN MG: 400 TABLET, FILM COATED ORAL at 12:30

## 2021-05-13 RX ADMIN — HYDROXYZINE PAMOATE PRN MG: 25 CAPSULE ORAL at 12:31

## 2021-05-14 ENCOUNTER — HOSPITAL ENCOUNTER (INPATIENT)
Dept: HOSPITAL 74 - YASAS | Age: 56
LOS: 3 days | Discharge: HOME | DRG: 772 | End: 2021-05-17
Attending: ALLERGY & IMMUNOLOGY | Admitting: ALLERGY & IMMUNOLOGY
Payer: COMMERCIAL

## 2021-05-14 VITALS — HEART RATE: 99 BPM | TEMPERATURE: 97.7 F | SYSTOLIC BLOOD PRESSURE: 126 MMHG | DIASTOLIC BLOOD PRESSURE: 74 MMHG

## 2021-05-14 DIAGNOSIS — Z91.5: ICD-10-CM

## 2021-05-14 DIAGNOSIS — J45.909: ICD-10-CM

## 2021-05-14 DIAGNOSIS — F10.20: Primary | ICD-10-CM

## 2021-05-14 DIAGNOSIS — B18.2: ICD-10-CM

## 2021-05-14 DIAGNOSIS — F13.10: ICD-10-CM

## 2021-05-14 DIAGNOSIS — F17.210: ICD-10-CM

## 2021-05-14 DIAGNOSIS — F11.20: ICD-10-CM

## 2021-05-14 DIAGNOSIS — F12.20: ICD-10-CM

## 2021-05-14 DIAGNOSIS — F31.9: ICD-10-CM

## 2021-05-14 DIAGNOSIS — L84: ICD-10-CM

## 2021-05-14 DIAGNOSIS — F19.282: ICD-10-CM

## 2021-05-14 DIAGNOSIS — F20.9: ICD-10-CM

## 2021-05-14 LAB
APPEARANCE UR: CLEAR
BILIRUB UR STRIP.AUTO-MCNC: NEGATIVE MG/DL
COLOR UR: YELLOW
KETONES UR QL STRIP: (no result)
LEUKOCYTE ESTERASE UR QL STRIP.AUTO: NEGATIVE
NITRITE UR QL STRIP: NEGATIVE
PH UR: 7 [PH] (ref 5–8)
PROT UR QL STRIP: NEGATIVE
PROT UR QL STRIP: NEGATIVE
SP GR UR: 1.02 (ref 1.01–1.03)
UROBILINOGEN UR STRIP-MCNC: 1 MG/DL (ref 0.2–1)

## 2021-05-14 PROCEDURE — U0003 INFECTIOUS AGENT DETECTION BY NUCLEIC ACID (DNA OR RNA); SEVERE ACUTE RESPIRATORY SYNDROME CORONAVIRUS 2 (SARS-COV-2) (CORONAVIRUS DISEASE [COVID-19]), AMPLIFIED PROBE TECHNIQUE, MAKING USE OF HIGH THROUGHPUT TECHNOLOGIES AS DESCRIBED BY CMS-2020-01-R: HCPCS

## 2021-05-14 PROCEDURE — HZ42ZZZ GROUP COUNSELING FOR SUBSTANCE ABUSE TREATMENT, COGNITIVE-BEHAVIORAL: ICD-10-PCS | Performed by: ALLERGY & IMMUNOLOGY

## 2021-05-14 PROCEDURE — C9803 HOPD COVID-19 SPEC COLLECT: HCPCS

## 2021-05-14 PROCEDURE — U0005 INFEC AGEN DETEC AMPLI PROBE: HCPCS

## 2021-05-14 RX ADMIN — METHOCARBAMOL SCH MG: 500 TABLET ORAL at 17:54

## 2021-05-14 RX ADMIN — SUVOREXANT PRN MG: 5 TABLET, FILM COATED ORAL at 21:24

## 2021-05-14 RX ADMIN — NICOTINE SCH MG: 21 PATCH TRANSDERMAL at 10:47

## 2021-05-14 RX ADMIN — Medication SCH MG: at 21:23

## 2021-05-14 RX ADMIN — METHADONE HYDROCHLORIDE SCH MG: 40 TABLET ORAL at 05:27

## 2021-05-14 RX ADMIN — LEVETIRACETAM SCH MG: 500 TABLET, FILM COATED ORAL at 21:24

## 2021-05-14 RX ADMIN — METHOCARBAMOL SCH MG: 500 TABLET ORAL at 21:23

## 2021-05-14 RX ADMIN — Medication SCH TAB: at 10:47

## 2021-05-15 RX ADMIN — LEVETIRACETAM SCH MG: 500 TABLET, FILM COATED ORAL at 21:40

## 2021-05-15 RX ADMIN — SUVOREXANT PRN MG: 5 TABLET, FILM COATED ORAL at 21:40

## 2021-05-15 RX ADMIN — METHOCARBAMOL SCH MG: 500 TABLET ORAL at 21:40

## 2021-05-15 RX ADMIN — METHADONE HYDROCHLORIDE SCH MG: 40 TABLET ORAL at 06:56

## 2021-05-15 RX ADMIN — NICOTINE SCH MG: 21 PATCH TRANSDERMAL at 12:03

## 2021-05-15 RX ADMIN — METHOCARBAMOL SCH MG: 500 TABLET ORAL at 17:06

## 2021-05-15 RX ADMIN — METHOCARBAMOL SCH: 500 TABLET ORAL at 14:45

## 2021-05-15 RX ADMIN — LEVETIRACETAM SCH MG: 500 TABLET, FILM COATED ORAL at 09:47

## 2021-05-15 RX ADMIN — Medication SCH TAB: at 09:47

## 2021-05-15 RX ADMIN — Medication SCH MG: at 21:40

## 2021-05-15 RX ADMIN — METHOCARBAMOL SCH MG: 500 TABLET ORAL at 09:47

## 2021-05-16 RX ADMIN — METHADONE HYDROCHLORIDE SCH MG: 40 TABLET ORAL at 06:51

## 2021-05-16 RX ADMIN — METHOCARBAMOL SCH MG: 500 TABLET ORAL at 09:43

## 2021-05-16 RX ADMIN — Medication SCH MG: at 21:19

## 2021-05-16 RX ADMIN — Medication SCH TAB: at 09:43

## 2021-05-16 RX ADMIN — METHOCARBAMOL SCH MG: 500 TABLET ORAL at 14:25

## 2021-05-16 RX ADMIN — SUVOREXANT PRN MG: 5 TABLET, FILM COATED ORAL at 21:20

## 2021-05-16 RX ADMIN — NICOTINE SCH MG: 21 PATCH TRANSDERMAL at 09:43

## 2021-05-16 RX ADMIN — METHOCARBAMOL SCH MG: 500 TABLET ORAL at 21:19

## 2021-05-16 RX ADMIN — LEVETIRACETAM SCH MG: 500 TABLET, FILM COATED ORAL at 09:43

## 2021-05-16 RX ADMIN — METHOCARBAMOL SCH MG: 500 TABLET ORAL at 17:36

## 2021-05-16 RX ADMIN — LEVETIRACETAM SCH MG: 500 TABLET, FILM COATED ORAL at 21:19

## 2021-05-17 VITALS — DIASTOLIC BLOOD PRESSURE: 86 MMHG | SYSTOLIC BLOOD PRESSURE: 135 MMHG | HEART RATE: 79 BPM | TEMPERATURE: 97 F

## 2021-05-17 RX ADMIN — Medication SCH TAB: at 10:16

## 2021-05-17 RX ADMIN — METHADONE HYDROCHLORIDE SCH MG: 40 TABLET ORAL at 06:32

## 2021-05-17 RX ADMIN — NICOTINE SCH MG: 21 PATCH TRANSDERMAL at 10:16

## 2021-05-17 RX ADMIN — METHOCARBAMOL SCH MG: 500 TABLET ORAL at 12:08

## 2021-05-17 RX ADMIN — METHOCARBAMOL SCH: 500 TABLET ORAL at 15:05

## 2021-05-17 RX ADMIN — LEVETIRACETAM SCH MG: 500 TABLET, FILM COATED ORAL at 10:16
